# Patient Record
Sex: MALE | Race: WHITE | Employment: FULL TIME | URBAN - METROPOLITAN AREA
[De-identification: names, ages, dates, MRNs, and addresses within clinical notes are randomized per-mention and may not be internally consistent; named-entity substitution may affect disease eponyms.]

---

## 2017-10-06 ENCOUNTER — OFFICE VISIT (OUTPATIENT)
Dept: FAMILY MEDICINE | Facility: CLINIC | Age: 32
End: 2017-10-06
Payer: COMMERCIAL

## 2017-10-06 VITALS
HEART RATE: 68 BPM | SYSTOLIC BLOOD PRESSURE: 113 MMHG | BODY MASS INDEX: 19.7 KG/M2 | TEMPERATURE: 97.6 F | WEIGHT: 133 LBS | OXYGEN SATURATION: 98 % | HEIGHT: 69 IN | DIASTOLIC BLOOD PRESSURE: 74 MMHG

## 2017-10-06 DIAGNOSIS — Z00.00 ROUTINE GENERAL MEDICAL EXAMINATION AT A HEALTH CARE FACILITY: Primary | ICD-10-CM

## 2017-10-06 DIAGNOSIS — Z13.220 SCREENING CHOLESTEROL LEVEL: ICD-10-CM

## 2017-10-06 DIAGNOSIS — Z23 NEED FOR PROPHYLACTIC VACCINATION AND INOCULATION AGAINST INFLUENZA: ICD-10-CM

## 2017-10-06 PROCEDURE — 90688 IIV4 VACCINE SPLT 0.5 ML IM: CPT | Performed by: FAMILY MEDICINE

## 2017-10-06 PROCEDURE — 80061 LIPID PANEL: CPT | Performed by: FAMILY MEDICINE

## 2017-10-06 PROCEDURE — 36415 COLL VENOUS BLD VENIPUNCTURE: CPT | Performed by: FAMILY MEDICINE

## 2017-10-06 PROCEDURE — 99395 PREV VISIT EST AGE 18-39: CPT | Mod: 25 | Performed by: FAMILY MEDICINE

## 2017-10-06 PROCEDURE — 90471 IMMUNIZATION ADMIN: CPT | Performed by: FAMILY MEDICINE

## 2017-10-06 NOTE — NURSING NOTE
"Chief Complaint   Patient presents with     Physical       Initial /74 (BP Location: Left arm, Patient Position: Chair, Cuff Size: Adult Regular)  Pulse 68  Temp 97.6  F (36.4  C) (Tympanic)  Ht 5' 9\" (1.753 m)  Wt 133 lb (60.3 kg)  SpO2 98%  BMI 19.64 kg/m2 Estimated body mass index is 19.64 kg/(m^2) as calculated from the following:    Height as of this encounter: 5' 9\" (1.753 m).    Weight as of this encounter: 133 lb (60.3 kg).  Medication Reconciliation: unable or not appropriate to perform     Melanie Chavez MA      "

## 2017-10-06 NOTE — LETTER
October 9, 2017      Simon Cuellar  [address]  [place] [address]        Dear ,    We are writing to inform you of your test results.    Cholesterol levels at goal. Recheck in about 5 year(s). Call or send us a [employer] message if you have questions.    Resulted Orders   Lipid panel reflex to direct LDL   Result Value Ref Range    Cholesterol 146 <200 mg/dL    Triglycerides 96 <150 mg/dL      Comment:      Non Fasting    HDL Cholesterol 80 >39 mg/dL    LDL Cholesterol Calculated 47 <100 mg/dL      Comment:      Desirable:       <100 mg/dl    Non HDL Cholesterol 66 <130 mg/dL       If you have any questions or concerns, please call the clinic at the number listed above.       Sincerely,        Tutu Garcia MD/nr

## 2017-10-06 NOTE — MR AVS SNAPSHOT
After Visit Summary   10/6/2017    Simon Cuellar    MRN: [id number]           Patient Information     Date Of Birth          1985        Visit Information        Provider Department      10/6/2017 3:00 PM Tutu Garcia MD [facility]        Today's Diagnoses     Routine general medical examination at a health care facility    -  1    Screening cholesterol level          Care Instructions      Preventive Health Recommendations  Male Ages 26 - 39    Yearly exam:             See your health care provider every year in order to  o   Review health changes.   o   Discuss preventive care.    o   Review your medicines if your doctor has prescribed any.    You should be tested each year for STDs (sexually transmitted diseases), if you re at risk.     After age 35, talk to your provider about cholesterol testing. If you are at risk for heart disease, have your cholesterol tested at least every 5 years.     If you are at risk for diabetes, you should have a diabetes test (fasting glucose).  Shots: Get a flu shot each year. Get a tetanus shot every 10 years.     Nutrition:    Eat at least 5 servings of fruits and vegetables daily.     Eat whole-grain bread, whole-wheat pasta and brown rice instead of white grains and rice.     Talk to your provider about Calcium and Vitamin D.     Lifestyle    Exercise for at least 150 minutes a week (30 minutes a day, 5 days a week). This will help you control your weight and prevent disease.     Limit alcohol to one drink per day.     No smoking.     Wear sunscreen to prevent skin cancer.     See your dentist every six months for an exam and cleaning.             Follow-ups after your visit        Additional Services     UROLOGY ADULT REFERRAL       Your provider has referred you to: [facility]: [facility] [phone]   [email or web address]  [facility]: [facility] [phone]  "[email or web address]  [place]: [facility]. [facility] [place] [phone]   [email or web address]  [place] [phone]   [email or web address]  [facility]: [facility] [place] [phone]   [email or web address]    Please be aware that coverage of these services is subject to the terms and limitations of your health insurance plan.  Call member services at your health plan with any benefit or coverage questions.      Please bring the following with you to your appointment:    (1) Any X-Rays, CTs or MRIs which have been performed.  Contact the facility where they were done to arrange for  prior to your scheduled appointment.    (2) List of current medications  (3) This referral request   (4) Any documents/labs given to you for this referral                  Who to contact     If you have questions or need follow up information about today's clinic visit or your schedule please contact [facility] directly at [phone].  Normal or non-critical lab and imaging results will be communicated to you by MyChart, letter or phone within 4 business days after the clinic has received the results. If you do not hear from us within 7 days, please contact the clinic through [employer] or phone. If you have a critical or abnormal lab result, we will notify you by phone as soon as possible.  Submit refill requests through [employer] or call your pharmacy and they will forward the refill request to us. Please allow 3 business days for your refill to be completed.          Additional Information About Your Visit        [employer] Information     [employer] lets you send messages to your doctor, view your test results, renew your prescriptions, schedule appointments and more. To sign up, go to [email or web address] . Click on \"Log in\" on the left side of the " "screen, which will take you to the Welcome page. Then click on \"Sign up Now\" on the right side of the page.     You will be asked to enter the access code listed below, as well as some personal information. Please follow the directions to create your username and password.     Your access code is: [email or web address]  Expires: 2018  3:37 PM     Your access code will  in 90 days. If you need help or a new code, please call your [place] clinic or [phone].        Care EveryWhere ID     This is your Care EveryWhere ID. This could be used by other organizations to access your [place] medical records  [id number]        Your Vitals Were     Pulse Temperature Height Pulse Oximetry BMI (Body Mass Index)       68 97.6  F (36.4  C) (Tympanic) 5' 9\" (1.753 m) 98% 19.64 kg/m2        Blood Pressure from Last 3 Encounters:   10/06/17 113/74   11/12/15 104/58    Weight from Last 3 Encounters:   10/06/17 133 lb (60.3 kg)   11/12/15 134 lb 12.8 oz (61.1 kg)              We Performed the Following     Lipid panel reflex to direct LDL     UROLOGY ADULT REFERRAL        Primary Care Provider    None Specified       No primary provider on file.        Equal Access to Services     FERDINAND DOWNING : Hadii antonette ku hadasho Soraeannali, waaxda luqadaha, qaybta kaalmada adeegyada, linda angela . So [facility] [phone].    ATENCIÓN: Si habla español, tiene a lopez disposición servicios gratuitos de asistencia lingüística. Llame al [phone].    We comply with applicable federal civil rights laws and [place] laws. We do not discriminate on the basis of race, color, national origin, age, disability, sex, sexual orientation, or gender identity.            Thank you!     Thank you for choosing [facility]  for your care. Our goal is always to provide you with excellent care. Hearing back from our patients is one way we can continue to improve our services. Please take a few minutes to complete the " written survey that you may receive in the mail after your visit with us. Thank you!             Your Updated Medication List - Protect others around you: Learn how to safely use, store and throw away your medicines at www.disposemymeds.org.      Notice  As of 10/6/2017  3:37 PM    You have not been prescribed any medications.

## 2017-10-06 NOTE — PROGRESS NOTES
SUBJECTIVE:   CC: Simon Cuellar is an 32 year old male who presents for preventative health visit.     Physical   Annual:     Getting at least 3 servings of Calcium per day::  Yes    Bi-annual eye exam::  Yes    Dental care twice a year::  Yes    Sleep apnea or symptoms of sleep apnea::  None    Diet::  Regular (no restrictions)    Frequency of exercise::  None    Taking medications regularly::  Yes    Medication side effects::  Not applicable    Additional concerns today::  YES      -------------------------------------    Today's PHQ-2 Score:   PHQ-2 ( 1999 Pfizer) 10/6/2017   Q1: Little interest or pleasure in doing things 0   Q2: Feeling down, depressed or hopeless 0   PHQ-2 Score 0   Q1: Little interest or pleasure in doing things Not at all   Q2: Feeling down, depressed or hopeless Not at all   PHQ-2 Score 0       Abuse: Current or Past(Physical, Sexual or Emotional)- No  Do you feel safe in your environment - Yes    Social History   Substance Use Topics     Smoking status: Former Smoker     Packs/day: 0.25     Years: 9.00     Types: Cigarettes     Quit date: 11/12/2012     Smokeless tobacco: Never Used     Alcohol use 0.0 oz/week     0 Standard drinks or equivalent per week      Comment: 2-3 drinks per week     The patient does not drink >3 drinks per day nor >7 drinks per week.    Last PSA: No results found for: PSA    Reviewed orders with patient. Reviewed health maintenance and updated orders accordingly - Yes  Labs reviewed in EPIC    Reviewed and updated as needed this visit by clinical staff  Tobacco  Allergies  Meds  Med Hx  Surg Hx  Fam Hx  Soc Hx        Reviewed and updated as needed this visit by Provider        ROS:  C: NEGATIVE for fever, chills, change in weight  I: NEGATIVE for worrisome rashes, moles or lesions  E: NEGATIVE for vision changes or irritation  ENT: NEGATIVE for ear, mouth and throat problems  R: NEGATIVE for significant cough or SOB  CV: NEGATIVE for chest pain,  "palpitations or peripheral edema  GI: NEGATIVE for nausea, abdominal pain, heartburn, or change in bowel habits   male: negative for dysuria, hematuria, decreased urinary stream, erectile dysfunction, urethral discharge  M: NEGATIVE for significant arthralgias or myalgia  N: NEGATIVE for weakness, dizziness or paresthesias  P: NEGATIVE for changes in mood or affect    OBJECTIVE:   /74 (BP Location: Left arm, Patient Position: Chair, Cuff Size: Adult Regular)  Pulse 68  Temp 97.6  F (36.4  C) (Tympanic)  Ht 5' 9\" (1.753 m)  Wt 133 lb (60.3 kg)  SpO2 98%  BMI 19.64 kg/m2    EXAM:  GENERAL: healthy, alert and no distress  EYES: Eyes grossly normal to inspection, PERRL and conjunctivae and sclerae normal  HENT: ear canals and TM's normal, nose and mouth without ulcers or lesions  NECK: no adenopathy, no asymmetry, masses, or scars and thyroid normal to palpation  RESP: lungs clear to auscultation - no rales, rhonchi or wheezes  CV: regular rate and rhythm, normal S1 S2, no S3 or S4, no murmur, click or rub, no peripheral edema and peripheral pulses strong  ABDOMEN: soft, nontender, no hepatosplenomegaly, no masses and bowel sounds normal  MS: no gross musculoskeletal defects noted, no edema  SKIN: no suspicious lesions or rashes  NEURO: Normal strength and tone, mentation intact and speech normal  PSYCH: mentation appears normal, affect normal/bright    ASSESSMENT/PLAN:       ICD-10-CM    1. Routine general medical examination at a health care facility Z00.00 UROLOGY ADULT REFERRAL   2. Screening cholesterol level Z13.220 Lipid panel reflex to direct LDL   3. Need for prophylactic vaccination and inoculation against influenza Z23 FLU VACCINE, 3 YRS +, IM (QUADRIVALENT W/PRESERVATIVES/MULTI-DOSE) [70929]     Vaccine Administration, Initial [65772]       COUNSELING:   Reviewed preventive health counseling, as reflected in patient instructions       Regular exercise       Healthy diet/nutrition       " "Immunizations    Vaccinated for: Influenza           reports that he quit smoking about 4 years ago. His smoking use included Cigarettes. He has a 2.25 pack-year smoking history. He has never used smokeless tobacco.      Estimated body mass index is 19.64 kg/(m^2) as calculated from the following:    Height as of this encounter: 5' 9\" (1.753 m).    Weight as of this encounter: 133 lb (60.3 kg).         Counseling Resources:  ATP IV Guidelines  Pooled Cohorts Equation Calculator  FRAX Risk Assessment  ICSI Preventive Guidelines  Dietary Guidelines for Americans, 2010  [employer]'s MyPlate  ASA Prophylaxis  Lung CA Screening    Tutu Garcia MD  [facility] for HPI/ROS submitted by the patient on 10/6/2017   PHQ-2 Score: 0    Injectable Influenza Immunization Documentation    1.  Is the person to be vaccinated sick today?   No    2. Does the person to be vaccinated have an allergy to a component   of the vaccine?   No    3. Has the person to be vaccinated ever had a serious reaction   to influenza vaccine in the past?   No    4. Has the person to be vaccinated ever had Guillain-Barré syndrome?   No    Form completed by Melanie Chavez MA           "

## 2017-10-07 LAB
CHOLEST SERPL-MCNC: 146 MG/DL
HDLC SERPL-MCNC: 80 MG/DL
LDLC SERPL CALC-MCNC: 47 MG/DL
NONHDLC SERPL-MCNC: 66 MG/DL
TRIGL SERPL-MCNC: 96 MG/DL

## 2018-11-23 ENCOUNTER — OFFICE VISIT (OUTPATIENT)
Dept: FAMILY MEDICINE | Facility: CLINIC | Age: 33
End: 2018-11-23
Payer: COMMERCIAL

## 2018-11-23 VITALS
WEIGHT: 141.13 LBS | BODY MASS INDEX: 20.84 KG/M2 | HEART RATE: 68 BPM | TEMPERATURE: 98.3 F | DIASTOLIC BLOOD PRESSURE: 63 MMHG | SYSTOLIC BLOOD PRESSURE: 102 MMHG

## 2018-11-23 DIAGNOSIS — B02.9 HERPES ZOSTER WITHOUT COMPLICATION: Primary | ICD-10-CM

## 2018-11-23 PROCEDURE — 99213 OFFICE O/P EST LOW 20 MIN: CPT | Performed by: FAMILY MEDICINE

## 2018-11-23 RX ORDER — ACYCLOVIR 800 MG/1
800 TABLET ORAL
Qty: 35 TABLET | Refills: 0 | Status: SHIPPED | OUTPATIENT
Start: 2018-11-23 | End: 2023-03-15

## 2018-11-23 ASSESSMENT — PAIN SCALES - GENERAL: PAINLEVEL: NO PAIN (0)

## 2018-11-23 NOTE — PROGRESS NOTES
SUBJECTIVE:   Simon Cuellar is a 33 year old male who presents to clinic today for the following health issues:       Rash on left side for the past 1.5 weeks    none    Problem list and histories reviewed & adjusted, as indicated.  Additional history: as documented         Reviewed and updated as needed this visit by clinical staff  Tobacco  Allergies  Meds  Med Hx  Surg Hx  Fam Hx  Soc Hx      Reviewed and updated as needed this visit by Provider          this itches a lot    Some pain    A rash back in college was a little different    Otherwise feels fine      On exam single patch of  Skin involvement about golf ball size diameter    Not real tender on palpation but sensation a bit different per patient compared to corresponding area on right     Very small red papular areas present in affected areas, no cellulitis.  Looks suspicious for shingles though does not extend around flank    ASSESSMENT / PLAN:  (B02.9) Herpes zoster without complication  (primary encounter diagnosis)  Comment: prudent to cover for possible shingles.  Even though has been 1 1/2 weeks since the rash started, very little if any downside to starting the acyclovir and may help rash and possibly prevent/ reduce neuralgia.  Discussed in detail.   Plan: acyclovir (ZOVIRAX) 800 MG tablet        Follow up prn symptoms       I reviewed the patient's medications, allergies, medical history, family history, and social history.    Myke Choudhury MD

## 2018-11-23 NOTE — MR AVS SNAPSHOT
After Visit Summary   11/23/2018    Simon Cuellar    MRN: [id number]           Patient Information     Date Of Birth          1985        Visit Information        Provider Department      11/23/2018 7:40 AM Myke Choudhury MD [facility]        Today's Diagnoses     Herpes zoster without complication    -  1      Care Instructions    Start acyclovir    Could use over the counter hydrocortisone cream    Try not to scratch    Lotion okay    Call if symptoms not resolving          Follow-ups after your visit        Who to contact     If you have questions or need follow up information about today's clinic visit or your schedule please contact [facility] directly at [phone].  Normal or non-critical lab and imaging results will be communicated to you by MyChart, letter or phone within 4 business days after the clinic has received the results. If you do not hear from us within 7 days, please contact the clinic through MyChart or phone. If you have a critical or abnormal lab result, we will notify you by phone as soon as possible.  Submit refill requests through [employer] or call your pharmacy and they will forward the refill request to us. Please allow 3 business days for your refill to be completed.          Additional Information About Your Visit        Care EveryWhere ID     This is your Care EveryWhere ID. This could be used by other organizations to access your [facility] medical records  [id number]        Your Vitals Were     Pulse Temperature BMI (Body Mass Index)             68 98.3  F (36.8  C) (Oral) 20.84 kg/m2          Blood Pressure from Last 3 Encounters:   11/23/18 102/63   10/06/17 113/74   11/12/15 104/58    Weight from Last 3 Encounters:   11/23/18 141 lb 2 oz (64 kg)   10/06/17 133 lb (60.3 kg)   11/12/15 134 lb 12.8 oz (61.1 kg)              Today, you had the following     No orders found for display         Today's  Medication Changes          These changes are accurate as of 11/23/18  8:18 AM.  If you have any questions, ask your nurse or doctor.               Start taking these medicines.        Dose/Directions    acyclovir 800 MG tablet   Commonly known as:  ZOVIRAX   Used for:  Herpes zoster without complication   Started by:  Myke Choudhury MD        Dose:  800 mg   Take 1 tablet (800 mg) by mouth 5 times daily   Quantity:  35 tablet   Refills:  0            Where to get your medicines      These medications were sent to [facility] - [place], [place] - [address]  [address], [place] [address]     Phone:  [phone]     acyclovir 800 MG tablet                Primary Care Provider Office Phone # Fax #    [facility] [phone]       [address] [place] [address]        Equal Access to Services     FERDINAND DOWNING : Hadii aad ku hadasho Sochelsy, waaxda luqadaha, qaybta kaalmada aderyleeyajc, linda holguin. So [facility] [phone].    ATENCIÓN: Si habla español, tiene a lopez disposición servicios gratuitos de asistencia lingüística. Herminio al [phone].    We comply with applicable federal civil rights laws and [place] laws. We do not discriminate on the basis of race, color, national origin, age, disability, sex, sexual orientation, or gender identity.            Thank you!     Thank you for choosing [facility]  for your care. Our goal is always to provide you with excellent care. Hearing back from our patients is one way we can continue to improve our services. Please take a few minutes to complete the written survey that you may receive in the mail after your visit with us. Thank you!             Your Updated Medication List - Protect others around you: Learn how to safely use, store and throw away your medicines at www.disposemymeds.org.          This list is accurate as of 11/23/18  8:18  AM.  Always use your most recent med list.                   Brand Name Dispense Instructions for use Diagnosis    acyclovir 800 MG tablet    ZOVIRAX    35 tablet    Take 1 tablet (800 mg) by mouth 5 times daily    Herpes zoster without complication

## 2018-11-23 NOTE — PATIENT INSTRUCTIONS
Start acyclovir    Could use over the counter hydrocortisone cream    Try not to scratch    Lotion okay    Call if symptoms not resolving

## 2021-03-28 ENCOUNTER — HEALTH MAINTENANCE LETTER (OUTPATIENT)
Age: 36
End: 2021-03-28

## 2021-09-11 ENCOUNTER — HEALTH MAINTENANCE LETTER (OUTPATIENT)
Age: 36
End: 2021-09-11

## 2021-12-13 ENCOUNTER — IMMUNIZATION (OUTPATIENT)
Dept: NURSING | Facility: CLINIC | Age: 36
End: 2021-12-13
Payer: COMMERCIAL

## 2021-12-13 PROCEDURE — 0004A PR COVID VAC PFIZER DIL RECON 30 MCG/0.3 ML IM: CPT

## 2021-12-13 PROCEDURE — 91300 PR COVID VAC PFIZER DIL RECON 30 MCG/0.3 ML IM: CPT

## 2022-04-23 ENCOUNTER — HEALTH MAINTENANCE LETTER (OUTPATIENT)
Age: 37
End: 2022-04-23

## 2022-10-30 ENCOUNTER — HEALTH MAINTENANCE LETTER (OUTPATIENT)
Age: 37
End: 2022-10-30

## 2023-03-15 ENCOUNTER — OFFICE VISIT (OUTPATIENT)
Dept: FAMILY MEDICINE | Facility: CLINIC | Age: 38
End: 2023-03-15
Payer: COMMERCIAL

## 2023-03-15 VITALS
BODY MASS INDEX: 21.33 KG/M2 | SYSTOLIC BLOOD PRESSURE: 123 MMHG | OXYGEN SATURATION: 79 % | WEIGHT: 149 LBS | RESPIRATION RATE: 16 BRPM | HEART RATE: 79 BPM | TEMPERATURE: 98 F | DIASTOLIC BLOOD PRESSURE: 82 MMHG | HEIGHT: 70 IN

## 2023-03-15 DIAGNOSIS — L72.3 INFLAMED EPIDERMOID CYST OF SKIN: ICD-10-CM

## 2023-03-15 DIAGNOSIS — Z13.220 SCREENING FOR HYPERLIPIDEMIA: ICD-10-CM

## 2023-03-15 DIAGNOSIS — Z11.4 SCREENING FOR HIV (HUMAN IMMUNODEFICIENCY VIRUS): ICD-10-CM

## 2023-03-15 DIAGNOSIS — Z30.09 FAMILY PLANNING: ICD-10-CM

## 2023-03-15 DIAGNOSIS — Z76.89 ENCOUNTER TO ESTABLISH CARE WITH NEW DOCTOR: ICD-10-CM

## 2023-03-15 DIAGNOSIS — Z00.00 ROUTINE GENERAL MEDICAL EXAMINATION AT A HEALTH CARE FACILITY: Primary | ICD-10-CM

## 2023-03-15 DIAGNOSIS — Z13.1 ENCOUNTER FOR SCREENING FOR DIABETES MELLITUS: ICD-10-CM

## 2023-03-15 DIAGNOSIS — Z11.59 NEED FOR HEPATITIS C SCREENING TEST: ICD-10-CM

## 2023-03-15 LAB
ALBUMIN SERPL BCG-MCNC: 4.8 G/DL (ref 3.5–5.2)
ALP SERPL-CCNC: 54 U/L (ref 40–129)
ALT SERPL W P-5'-P-CCNC: 16 U/L (ref 10–50)
ANION GAP SERPL CALCULATED.3IONS-SCNC: 15 MMOL/L (ref 7–15)
AST SERPL W P-5'-P-CCNC: 24 U/L (ref 10–50)
BILIRUB SERPL-MCNC: 0.7 MG/DL
BUN SERPL-MCNC: 12.7 MG/DL (ref 6–20)
CALCIUM SERPL-MCNC: 9.6 MG/DL (ref 8.6–10)
CHLORIDE SERPL-SCNC: 102 MMOL/L (ref 98–107)
CHOLEST SERPL-MCNC: 163 MG/DL
CREAT SERPL-MCNC: 1.19 MG/DL (ref 0.67–1.17)
DEPRECATED HCO3 PLAS-SCNC: 25 MMOL/L (ref 22–29)
GFR SERPL CREATININE-BSD FRML MDRD: 81 ML/MIN/1.73M2
GLUCOSE SERPL-MCNC: 84 MG/DL (ref 70–99)
HBA1C MFR BLD: 5.2 % (ref 0–5.6)
HCV AB SERPL QL IA: NONREACTIVE
HDLC SERPL-MCNC: 76 MG/DL
HIV 1+2 AB+HIV1 P24 AG SERPL QL IA: NONREACTIVE
LDLC SERPL CALC-MCNC: 72 MG/DL
NONHDLC SERPL-MCNC: 87 MG/DL
POTASSIUM SERPL-SCNC: 4 MMOL/L (ref 3.4–5.3)
PROT SERPL-MCNC: 7.7 G/DL (ref 6.4–8.3)
SODIUM SERPL-SCNC: 142 MMOL/L (ref 136–145)
TRIGL SERPL-MCNC: 76 MG/DL

## 2023-03-15 PROCEDURE — 87389 HIV-1 AG W/HIV-1&-2 AB AG IA: CPT | Performed by: NURSE PRACTITIONER

## 2023-03-15 PROCEDURE — 99213 OFFICE O/P EST LOW 20 MIN: CPT | Mod: 25 | Performed by: NURSE PRACTITIONER

## 2023-03-15 PROCEDURE — 83036 HEMOGLOBIN GLYCOSYLATED A1C: CPT | Performed by: NURSE PRACTITIONER

## 2023-03-15 PROCEDURE — 36415 COLL VENOUS BLD VENIPUNCTURE: CPT | Performed by: NURSE PRACTITIONER

## 2023-03-15 PROCEDURE — 80061 LIPID PANEL: CPT | Performed by: NURSE PRACTITIONER

## 2023-03-15 PROCEDURE — 86803 HEPATITIS C AB TEST: CPT | Performed by: NURSE PRACTITIONER

## 2023-03-15 PROCEDURE — 99385 PREV VISIT NEW AGE 18-39: CPT | Mod: 25 | Performed by: NURSE PRACTITIONER

## 2023-03-15 PROCEDURE — 80053 COMPREHEN METABOLIC PANEL: CPT | Performed by: NURSE PRACTITIONER

## 2023-03-15 PROCEDURE — 90636 HEP A/HEP B VACC ADULT IM: CPT | Performed by: NURSE PRACTITIONER

## 2023-03-15 PROCEDURE — 90471 IMMUNIZATION ADMIN: CPT | Performed by: NURSE PRACTITIONER

## 2023-03-15 ASSESSMENT — ENCOUNTER SYMPTOMS
NAUSEA: 0
HEMATOCHEZIA: 0
HEARTBURN: 0
HEADACHES: 1
CONSTIPATION: 0
PALPITATIONS: 0
JOINT SWELLING: 0
HEMATURIA: 0
NERVOUS/ANXIOUS: 0
FREQUENCY: 0
ARTHRALGIAS: 0
MYALGIAS: 0
SHORTNESS OF BREATH: 0
DIZZINESS: 0
WEAKNESS: 0
FEVER: 0
ABDOMINAL PAIN: 0
CHILLS: 0
DIARRHEA: 0
EYE PAIN: 0
SORE THROAT: 0
PARESTHESIAS: 0
COUGH: 1
DYSURIA: 0

## 2023-03-15 ASSESSMENT — PAIN SCALES - GENERAL: PAINLEVEL: NO PAIN (0)

## 2023-03-15 NOTE — NURSING NOTE
Prior to immunization administration, verified patients identity using patient s name and date of birth. Please see Immunization Activity for additional information.     Screening Questionnaire for Adult Immunization    Are you sick today?   No   Do you have allergies to medications, food, a vaccine component or latex?   No   Have you ever had a serious reaction after receiving a vaccination?   No   Do you have a long-term health problem with heart, lung, kidney, or metabolic disease (e.g., diabetes), asthma, a blood disorder, no spleen, complement component deficiency, a cochlear implant, or a spinal fluid leak?  Are you on long-term aspirin therapy?   No   Do you have cancer, leukemia, HIV/AIDS, or any other immune system problem?   Yes   Do you have a parent, brother, or sister with an immune system problem?   No   In the past 3 months, have you taken medications that affect  your immune system, such as prednisone, other steroids, or anticancer drugs; drugs for the treatment of rheumatoid arthritis, Crohn s disease, or psoriasis; or have you had radiation treatments?   No   Have you had a seizure, or a brain or other nervous system problem?   No   During the past year, have you received a transfusion of blood or blood    products, or been given immune (gamma) globulin or antiviral drug?   No   For women: Are you pregnant or is there a chance you could become       pregnant during the next month?   No   Have you received any vaccinations in the past 4 weeks?   No     Immunization questionnaire was positive for at least one answer.  Notified Kd.        Per orders of Dr. Belcher, injection of Twinrx given by Anurag Steiner CMA. Patient instructed to remain in clinic for 15 minutes afterwards, and to report any adverse reaction to me immediately.       Screening performed by Anurag Steiner CMA on 3/15/2023 at 2:34 PM.

## 2023-03-15 NOTE — PROGRESS NOTES
SUBJECTIVE:   CC: CHRISTY is an 37 year old who presents for preventative health visit.   Patient has been advised of split billing requirements and indicates understanding: Yes     37 year old year old male  with PMH There is no problem list on file for this patient.   in clinic for preventive health care exam.     In addition to the preventive visit, 25  minutes of the appointment were spent evaluating and developing a treatment plan for his additional concern(s).      - establish care     Healthy Habits:     Getting at least 3 servings of Calcium per day:  Yes    Bi-annual eye exam:  Yes    Dental care twice a year:  NO    Sleep apnea or symptoms of sleep apnea:  Daytime drowsiness    Diet:  Regular (no restrictions)    Frequency of exercise:  4-5 days/week    Duration of exercise:  15-30 minutes    Taking medications regularly:  Yes    Medication side effects:  None    PHQ-2 Total Score: 2    Additional concerns today:  No    Today's PHQ-2 Score:       3/15/2023     1:55 PM   PHQ-2 ( 1999 [employer])   Q1: Little interest or pleasure in doing things 0   Q2: Feeling down, depressed or hopeless 2   PHQ-2 Score 2   Q1: Little interest or pleasure in doing things Not at all   Q2: Feeling down, depressed or hopeless More than half the days   PHQ-2 Score 2       Have you ever done Advance Care Planning? (For example, a Health Directive, POLST, or a discussion with a medical provider or your loved ones about your wishes): No, advance care planning information given to patient to review.  Patient declined advance care planning discussion at this time.    Social History     Tobacco Use     Smoking status: Some Days     Packs/day: 0.25     Years: 9.00     Pack years: 2.25     Types: Cigarettes     Last attempt to quit: 11/12/2012     Years since quitting: 10.4     Smokeless tobacco: Never   Vaping Use     Vaping status: Never Used   Substance Use Topics     Alcohol use: Yes     Alcohol/week: 0.0 standard drinks of alcohol      "Comment: 2-3 drinks per week             3/15/2023     2:03 PM   Alcohol Use   Prescreen: >3 drinks/day or >7 drinks/week? No          View : No data to display.                Last PSA: No results found for: PSA    Reviewed orders with patient. Reviewed health maintenance and updated orders accordingly - Yes  Lab work is in process  Labs reviewed in EPIC  BP Readings from Last 3 Encounters:   03/15/23 123/82   11/23/18 102/63   10/06/17 113/74    Wt Readings from Last 3 Encounters:   03/15/23 67.6 kg (149 lb)   11/23/18 64 kg (141 lb 2 oz)   10/06/17 60.3 kg (133 lb)         Reviewed and updated as needed this visit by clinical staff   Tobacco  Allergies  Meds  Problems  Med Hx  Surg Hx  Fam Hx          Reviewed and updated as needed this visit by Provider   Tobacco  Allergies  Meds  Problems  Med Hx  Surg Hx  Fam Hx             Review of Systems   Constitutional: Negative for chills and fever.   HENT: Negative for congestion, ear pain, hearing loss and sore throat.    Eyes: Negative for pain and visual disturbance.   Respiratory: Positive for cough. Negative for shortness of breath.    Cardiovascular: Negative for chest pain, palpitations and peripheral edema.   Gastrointestinal: Negative for abdominal pain, constipation, diarrhea, heartburn, hematochezia and nausea.   Genitourinary: Negative for dysuria, frequency, genital sores, hematuria, impotence, penile discharge and urgency.   Musculoskeletal: Negative for arthralgias, joint swelling and myalgias.   Skin: Negative for rash.   Neurological: Positive for headaches. Negative for dizziness, weakness and paresthesias.   Psychiatric/Behavioral: Negative for mood changes. The patient is not nervous/anxious.          OBJECTIVE:   /82 (BP Location: Right arm, Patient Position: Sitting, Cuff Size: Adult Regular)   Pulse 79   Temp 98  F (36.7  C) (Temporal)   Resp 16   Ht 1.778 m (5' 10\")   Wt 67.6 kg (149 lb)   SpO2 (!) 79%   BMI 21.38 " kg/m      Physical Exam  GENERAL: healthy, alert and no distress  EYES: Eyes grossly normal to inspection, PERRL and conjunctivae and sclerae normal  HENT: ear canals and TM's normal, nose and mouth without ulcers or lesions  NECK: no adenopathy, no asymmetry, masses, or scars and thyroid normal to palpation  RESP: lungs clear to auscultation - no rales, rhonchi or wheezes  CV: regular rate and rhythm, normal S1 S2, no S3 or S4, no murmur, click or rub, no peripheral edema and peripheral pulses strong  ABDOMEN: soft, nontender, no hepatosplenomegaly, no masses and bowel sounds normal  MS: no gross musculoskeletal defects noted, no edema  SKIN: no suspicious lesions or rashes  NEURO: Normal strength and tone, mentation intact and speech normal  PSYCH: mentation appears normal, affect normal/bright    Diagnostic Test Results:  Labs reviewed in Epic    ASSESSMENT/PLAN:   Simon was seen today for physical.    Diagnoses and all orders for this visit:    Routine general medical examination at a health care facility  Preventative exam w/no abnormalities and/or concerns listed in diagnoses; discussed health maintenance screenings including prostate, breast, cervical and colorectal ca screenings related to gender;  reviewed and reconciled medication, medical history and patient related health concerns  Plan: obtain metabolic labs    -     REVIEW OF HEALTH MAINTENANCE PROTOCOL ORDERS  -     Lipid Profile; Future  -     Hemoglobin A1c; Future  -     Comprehensive metabolic panel; Future  -     HEPA/HEPB VACCINE ADULT IM  -     Cancel: Lipid Profile  -     Hemoglobin A1c  -     Comprehensive metabolic panel    Screening for HIV (human immunodeficiency virus)  -     HIV Antigen Antibody Combo; Future  -     HIV Antigen Antibody Combo    Need for hepatitis C screening test  -     Hepatitis C Screen Reflex to HCV RNA Quant and Genotype; Future  -     Hepatitis C Screen Reflex to HCV RNA Quant and Genotype    Screening for  hyperlipidemia  Assess ASCVD risk for statin therapy to reduce CAD/CVA ristk  Plan: fasting/non-fasting test  -     Lipid panel reflex to direct LDL Fasting; Future  -     Lipid panel reflex to direct LDL Non-fasting; Future  -     Lipid Profile; Future  -     Lipid panel reflex to direct LDL Non-fasting  -     Cancel: Lipid Profile  -     Comprehensive metabolic panel    Encounter for screening for diabetes mellitus  -     Hemoglobin A1c; Future  -     Comprehensive metabolic panel; Future  -     Hemoglobin A1c    Encounter to establish care with new doctor  Reviewed chronic health conditions; medications, labs and pertinent health concerns today    Inflamed epidermoid cyst of skin  -     Adult Dermatology Referral; Future    Family planning  -     Adult Urology  Referral; Future        Patient has been advised of split billing requirements and indicates understanding: Yes      COUNSELING:   Reviewed preventive health counseling, as reflected in patient instructions        He reports that he has been smoking cigarettes. He has a 2.25 pack-year smoking history. He has never used smokeless tobacco.            DARIUS Summers CNP  [facility]

## 2023-03-20 ENCOUNTER — ALLIED HEALTH/NURSE VISIT (OUTPATIENT)
Dept: FAMILY MEDICINE | Facility: CLINIC | Age: 38
End: 2023-03-20
Payer: COMMERCIAL

## 2023-03-20 VITALS — OXYGEN SATURATION: 97 %

## 2023-03-20 DIAGNOSIS — R68.89 ABNORMAL VITAL SIGNS: Primary | ICD-10-CM

## 2023-03-20 PROCEDURE — 99207 PR NO CHARGE NURSE ONLY: CPT

## 2023-03-20 NOTE — PROGRESS NOTES
See 3/15/23 myChart encounter.     Rechecked SpO2 as was documented as 79% from 3/15/23 office visit.    This was very likely an error but wanted to be sure.     O2 saturation today is 97% on room air.     The numbers must have gotten switched when MA was entering them in as 79.     Updated O2 in chart.       Florencia Raines, JODYN RN  [facility]

## 2023-03-30 NOTE — TELEPHONE ENCOUNTER
MEDICAL RECORDS REQUEST   [facility]  Urology Clinic  [address] [place] [address]  PHONE: [phone]  Fax: [phone]        FUTURE VISIT INFORMATION                                                   Simon Pineda Alisa, : 1985 scheduled for future visit at [facility]    APPOINTMENT INFORMATION:    Date: 2023    Provider:  Ez Michele MD    Reason for Visit/Diagnosis: VASECTOMY CONSULT    REFERRAL INFORMATION:    Referring provider:  Rosa Maria Yi APRN CNP in [facility]      RECORDS REQUESTED FOR VISIT                                                     NOTES  STATUS/DETAILS   OFFICE NOTE from referring provider  yes, 03/15/2023 -- Rosa Maria Yi APRN CNP in [facility]   MEDICATION LIST  no   LABS     URINALYSIS (UA)  no     PRE-VISIT CHECKLIST      Joint diagnostic appointment coordinated correctly          (ensure right order & amount of time) Yes   RECORD COLLECTION COMPLETE Yes

## 2023-04-05 NOTE — RESULT ENCOUNTER NOTE
Roberto Carlos HARRISON,    Your recent results are normal. Any results slightly above or below the normal range have been evaluated as clinically stable.     Let me know if you have any questions or concerns.    Sincerely,  DARIUS Summers CNP

## 2023-05-31 ENCOUNTER — PRE VISIT (OUTPATIENT)
Dept: UROLOGY | Facility: CLINIC | Age: 38
End: 2023-05-31
Payer: COMMERCIAL

## 2023-05-31 NOTE — CONFIDENTIAL NOTE
Reason for visit: vasectomy consult    Records/imaging/labs/orders: in epic    At Rooming: jana Lara  05/31/23  2:39 PM

## 2023-06-09 ENCOUNTER — PRE VISIT (OUTPATIENT)
Dept: UROLOGY | Facility: CLINIC | Age: 38
End: 2023-06-09

## 2023-06-09 ENCOUNTER — VIRTUAL VISIT (OUTPATIENT)
Dept: UROLOGY | Facility: CLINIC | Age: 38
End: 2023-06-09
Attending: NURSE PRACTITIONER
Payer: COMMERCIAL

## 2023-06-09 DIAGNOSIS — Z30.09 FAMILY PLANNING: ICD-10-CM

## 2023-06-09 PROCEDURE — 99202 OFFICE O/P NEW SF 15 MIN: CPT | Mod: VID | Performed by: UROLOGY

## 2023-06-09 NOTE — LETTER
6/9/2023       RE: Simon Cuellar  [address]  [place] [address]     Dear Colleague,    Thank you for referring your patient, Simon Cuellar, to the [facility] [place] [facility]. Please see a copy of my visit note below.    CHRISTY is a 37 year old who is being evaluated via a billable video visit.      How would you like to obtain your AVS? MyChart  If the video visit is dropped, the invitation should be resent by: Text to cell phone: [phone]  Will anyone else be joining your video visit? No        Video-Visit Details    Type of service:  Video Visit       Originating Location (pt. Location): Home    Distant Location (provider location):  On-site  Platform used for Video Visit: Bhupendra     CC: Desires sterilization, consult for vasectomy from Dr. Rosa Maria Yi     HPI: Simon Cuellar is a 37 year old male with 2 children, and he is intersted in getting a vasectomy for sterilization.      No history of bleeding problems in him or family.  History of right torsion age 14. Status-post pexy.    PMH:   Past Medical History:   Diagnosis Date    Childhood asthma     NO ACTIVE PROBLEMS      Past Surgical History:   Procedure Laterality Date    testicular surgery      torsion   he thinks they maybe just fixed the right side.    FAMILY HX:   Family History   Problem Relation Age of Onset    Colon Cancer Paternal Grandmother     Cancer Paternal Grandfather     Diabetes Maternal Grandfather     Lung Cancer Paternal Aunt       No history of coagulopathies, no  malignancies.     ALLERGIES:    No Known Allergies    MEDS:   No prescription medications at this time.    SOCIAL HISTORY:  Social History     Tobacco Use    Smoking status: Some Days     Packs/day: 0.25     Years: 9.00     Pack years: 2.25     Types: Cigarettes     Last attempt to quit: 11/12/2012     Years since quitting: 10.5    Smokeless tobacco: Never    Vaping Use    Vaping status: Never Used   Substance Use Topics    Alcohol use: Yes     Alcohol/week: 0.0 standard drinks of alcohol     Comment: 2-3 drinks per week    Drug use: No        GENERAL PHYSICAL EXAM:   Exam  General- Alert, oriented, nad.  Pleasant and conversant.  Eyes- anicteric, EOMI.  Resps- normal, non-labored.  No cough  Abdomen-  nondistended.   exam- deferred.   Neurological - no tremors  Skin - no discoloration/ lesions noted  Psychiatric - no anxiety, alert & oriented.       The rest of a comprehensive physical examination is deferred due to video visit restrictions.       I discussed with him at length the risks and benefits of the procedure. He understands that this is a sterilization procedure, and not reversible contraception. He understands that reversals, while possible, are not guaranteed to work and fairly complex. I discussed with him the option of sperm cryopreservation.     I stressed that he continues to be fertile in the post-operative period, and that he should continue using other contraceptive methods, such as a condom, until he obtains a semen analysis and we review the results to confirm success of the procedure and infertility. I also stressed to him that recanalization and pregnancy can occur in about 1 per thousand cases, possibly more even after we clear him with a semen analysis showing no motile sperm. I counseled him on the jorge-operative risks of bleeding, infection, pain.  I described to him post-vasectomy pain syndrome that can occur in about 1 to 2% of men undergoing vasectomy.     We also discussed recovery times (typically days if no complications) and post-operative care including use of ice packs, pain medication and wound care.    Plan  Schedule vasectomy procedure in clinic.       Additional Coding Information:    Problems:  One acute uncomplicated illness or injury    Data Reviewed  Minimal or none    Level of risk:   low risk (e.g., OTC medication or  observation, minor surgery without risks)    Time spent:  10 minutes spent on the date of the encounter doing chart review, history and exam, documentation and further activities as noted above. This included the video chat time above.      Ez Michele MD

## 2023-06-09 NOTE — PROGRESS NOTES
CHRISTY is a 37 year old who is being evaluated via a billable video visit.      How would you like to obtain your AVS? MyChart  If the video visit is dropped, the invitation should be resent by: Text to cell phone: [phone]  Will anyone else be joining your video visit? No        Video-Visit Details    Type of service:  Video Visit       Originating Location (pt. Location): Home    Distant Location (provider location):  On-site  Platform used for Video Visit: Bhupendra     CC: Desires sterilization, consult for vasectomy from Dr. Rosa Maria Yi     HPI: Simon Cuellar is a 37 year old male with 2 children, and he is intersted in getting a vasectomy for sterilization.      No history of bleeding problems in him or family.  History of right torsion age 14. Status-post pexy.    PMH:   Past Medical History:   Diagnosis Date     Childhood asthma      NO ACTIVE PROBLEMS      Past Surgical History:   Procedure Laterality Date     testicular surgery      torsion   he thinks they maybe just fixed the right side.    FAMILY HX:   Family History   Problem Relation Age of Onset     Colon Cancer Paternal Grandmother      Cancer Paternal Grandfather      Diabetes Maternal Grandfather      Lung Cancer Paternal Aunt       No history of coagulopathies, no  malignancies.     ALLERGIES:    No Known Allergies    MEDS:   No prescription medications at this time.    SOCIAL HISTORY:  Social History     Tobacco Use     Smoking status: Some Days     Packs/day: 0.25     Years: 9.00     Pack years: 2.25     Types: Cigarettes     Last attempt to quit: 11/12/2012     Years since quitting: 10.5     Smokeless tobacco: Never   Vaping Use     Vaping status: Never Used   Substance Use Topics     Alcohol use: Yes     Alcohol/week: 0.0 standard drinks of alcohol     Comment: 2-3 drinks per week     Drug use: No        GENERAL PHYSICAL EXAM:   Exam  General- Alert, oriented, nad.  Pleasant and conversant.  Eyes- anicteric, EOMI.  Resps- normal,  non-labored.  No cough  Abdomen-  nondistended.   exam- deferred.   Neurological - no tremors  Skin - no discoloration/ lesions noted  Psychiatric - no anxiety, alert & oriented.       The rest of a comprehensive physical examination is deferred due to video visit restrictions.       I discussed with him at length the risks and benefits of the procedure. He understands that this is a sterilization procedure, and not reversible contraception. He understands that reversals, while possible, are not guaranteed to work and fairly complex. I discussed with him the option of sperm cryopreservation.     I stressed that he continues to be fertile in the post-operative period, and that he should continue using other contraceptive methods, such as a condom, until he obtains a semen analysis and we review the results to confirm success of the procedure and infertility. I also stressed to him that recanalization and pregnancy can occur in about 1 per thousand cases, possibly more even after we clear him with a semen analysis showing no motile sperm. I counseled him on the jorge-operative risks of bleeding, infection, pain.  I described to him post-vasectomy pain syndrome that can occur in about 1 to 2% of men undergoing vasectomy.     We also discussed recovery times (typically days if no complications) and post-operative care including use of ice packs, pain medication and wound care.    Plan  Schedule vasectomy procedure in clinic.       Additional Coding Information:    Problems:  One acute uncomplicated illness or injury    Data Reviewed  Minimal or none    Level of risk:   low risk (e.g., OTC medication or observation, minor surgery without risks)    Time spent:  10 minutes spent on the date of the encounter doing chart review, history and exam, documentation and further activities as noted above. This included the video chat time above.

## 2023-11-07 ENCOUNTER — PRE VISIT (OUTPATIENT)
Dept: UROLOGY | Facility: CLINIC | Age: 38
End: 2023-11-07
Payer: COMMERCIAL

## 2023-11-07 DIAGNOSIS — Z30.09 FAMILY PLANNING: Primary | ICD-10-CM

## 2023-11-07 NOTE — CONFIDENTIAL NOTE
Patient coming in for vasectomy procedure    Sent [employer] message with prep which included:    Stop all blood thinners for seven days prior to procedure  Eat a meal prior to procedure  Shave the hair from the base of the penis and scrotum the night prior to procedure.      Steff Lara  11/07/23  3:46 PM      
none

## 2023-11-08 ENCOUNTER — VIRTUAL VISIT (OUTPATIENT)
Dept: FAMILY MEDICINE | Facility: CLINIC | Age: 38
End: 2023-11-08
Payer: COMMERCIAL

## 2023-11-08 DIAGNOSIS — J30.2 SEASONAL ALLERGIC RHINITIS, UNSPECIFIED TRIGGER: ICD-10-CM

## 2023-11-08 DIAGNOSIS — J45.21 MILD INTERMITTENT ASTHMA WITH ACUTE EXACERBATION: Primary | ICD-10-CM

## 2023-11-08 PROCEDURE — 99213 OFFICE O/P EST LOW 20 MIN: CPT | Mod: VID

## 2023-11-08 RX ORDER — BUDESONIDE AND FORMOTEROL FUMARATE DIHYDRATE 80; 4.5 UG/1; UG/1
2 AEROSOL RESPIRATORY (INHALATION) 2 TIMES DAILY
Qty: 10.2 G | Refills: 0 | Status: SHIPPED | OUTPATIENT
Start: 2023-11-08 | End: 2024-04-26

## 2023-11-08 NOTE — COMMUNITY RESOURCES LIST (ENGLISH)
11/08/2023   [facility] [employer]  N/A  For questions about this resource list or additional care needs, please contact your primary care clinic or care manager.  Phone: [phone]   Email: N/A   Address: [address] [place] [id number]   Hours: N/A        Financial Stability       Utility payment assistance  1  [employer] [facility] - Energy Assistance Distance: 1.04 miles      Phone/Virtual   [address] [place] [address]  Language: English, Hmong, Anguillan, Ecuadorean  Hours: Mon - Fri 8:00 AM - 4:30 PM  Fees: Free   Phone: (455) 310-9927 Email: info@caprw.org Website: http://www.caprw.org/     2  [facility] Distance: 3.42 miles      Phone/Virtual   [address] [place] [address]  Language: English, Hmong, Somali, Ecuadorean  Hours: Mon - Fri 10:00 AM - 3:00 PM  Fees: Free   Phone: (355) 130-3661 Email: Robbi@[email or web address].[email or web address] Website: [email or web address]          Important Numbers & Websites       Emergency Services   911  City Services   311  Poison Control   (900) 136-3377  Suicide Prevention Lifeline   (917) 963-6895 (TALK)  Child Abuse Hotline   (230) 802-8596 (4-A-Child)  Sexual Assault Hotline   (827) 122-6887 (HOPE)  National Runaway Safeline   (894) 311-3356 (RUNAWAY)  All-Options Talkline   (681) 416-5463  Substance Abuse Referral   (795) 459-1811 (HELP)

## 2023-11-08 NOTE — PATIENT INSTRUCTIONS
Your shortness of breath is most likely being caused by asthma.  Asthma is a condition of the lungs that is caused by both inflammation, and obstruction of the airways leading to your lungs.  Asthma symptoms can be aggravated by a number of things including environmental pollutants or allergens.  It sounds like your asthma may be flared up because of environmental allergies, which is not uncommon.     Because her symptoms are mainly flared up at night, it is okay for the time being to treat your symptoms as needed.  You can continue to take your daily allergy medication to manage seasonal allergies that might be leading to shortness of breath.    I have also prescribed a Symbicort inhaler that you can take as needed for acute episodes of shortness of breath.  As we discussed, you can take 2 puffs when you are feeling short of breath, give this a little bit of time to work, and then you may repeat another 2 puffs if you are still feeling short of breath.  If you are needing more than 4 puffs of your inhaler without symptom resolution, it is a good idea to be seen in the clinic    You can also  a nasal saline spray over-the-counter.  This can often help to flush out the sinuses, and create easier air passage through your nose.    Please follow-up about the symptoms with your primary care provider.  If you are having symptoms of increased need for your inhaler use, increased nighttime awakening, or increased shortness of breath with normal daily activity, it may be time to discuss escalation in your asthma action plan.    Please seek immediate medical attention with concerns for shortness of breath that is unresolved with medication, chest pain, palpitations, lightheadedness or dizziness.

## 2023-11-08 NOTE — PROGRESS NOTES
CHRISTY is a 38 year old who is being evaluated via a billable video visit.      How would you like to obtain your AVS? MyChart  If the video visit is dropped, the invitation should be resent by: Text to cell phone: [phone]  Will anyone else be joining your video visit? No          Assessment & Plan     (J45.21) Mild intermittent asthma with acute exacerbation  (primary encounter diagnosis)  Comment: Nighttime awakening twice in the evening.  No daytime exacerbations.  No acute episodes requiring hospitalization.  Will able to maintain daily activity.  History of childhood asthma, which she based out of into adulthood.  Likely being exacerbated by seasonal allergies, and environmental pollutants.  Justifiable to begin with as needed asthma treatment rather than a controller inhaler.  Will reevaluate need for escalating plan of care based on frequency and severity of exacerbations.  Need to consider using Symbicort daily as a controller option, if symptoms worsen or do not improve.  Plan: budesonide-formoterol (SYMBICORT) 80-4.5         MCG/ACT Inhaler        -Monitor symptom severity and frequency, and be seen in person with wheezing or shortness of breath that does not resolve with current plan of care  -Follow-up with PCP at next preventative health visit to discuss symptoms status and further management    (J30.2) Seasonal allergic rhinitis, unspecified trigger  Comment: Nasal and sinus congestion as well as increased wheezing and shortness of breath exacerbated mostly by dust and environmental allergens per patient.  Patient notes symptoms have improved with daily dosing of Allegra.   Plan: Continue daily dosing of Allegra  -Begin the use of a nasal saline spray to encourage sinus drainage.  -Can consider nasal antihistamine rather than oral Allegra if he finds that this works better         Nicotine/Tobacco Cessation:  He reports that he has been smoking cigarettes. He has a 2.25 pack-year smoking history. He  has never used smokeless tobacco.  Nicotine/Tobacco Cessation Plan:   Information offered: Patient not interested at this time      Patient Instructions   Your shortness of breath is most likely being caused by asthma.  Asthma is a condition of the lungs that is caused by both inflammation, and obstruction of the airways leading to your lungs.  Asthma symptoms can be aggravated by a number of things including environmental pollutants or allergens.  It sounds like your asthma may be flared up because of environmental allergies, which is not uncommon.     Because her symptoms are mainly flared up at night, it is okay for the time being to treat your symptoms as needed.  You can continue to take your daily allergy medication to manage seasonal allergies that might be leading to shortness of breath.    I have also prescribed a Symbicort inhaler that you can take as needed for acute episodes of shortness of breath.  As we discussed, you can take 2 puffs when you are feeling short of breath, give this a little bit of time to work, and then you may repeat another 2 puffs if you are still feeling short of breath.  If you are needing more than 4 puffs of your inhaler without symptom resolution, it is a good idea to be seen in the clinic    You can also  a nasal saline spray over-the-counter.  This can often help to flush out the sinuses, and create easier air passage through your nose.    Please follow-up about the symptoms with your primary care provider.  If you are having symptoms of increased need for your inhaler use, increased nighttime awakening, or increased shortness of breath with normal daily activity, it may be time to discuss escalation in your asthma action plan.    Please seek immediate medical attention with concerns for shortness of breath that is unresolved with medication, chest pain, palpitations, lightheadedness or dizziness.    DARIUS Desir [facility]  "MIDWAY    Subjective   DRWE is a 38 year old, presenting for the following health issues:  office visit and Recheck Medication (Pt reports that he is experiencing shortness of breath for about 2 months every morning around 4am.)      11/8/2023    10:55 AM   Additional Questions   Roomed by esther   Accompanied by alone         11/8/2023    10:55 AM   Patient Reported Additional Medications   Patient reports taking the following new medications none     Drew is a 38-year-old previously healthy male who presents today with concerns for shortness of breath at night.  Patient reports that he had a diagnosis of childhood asthma and allergies for which he took medication as a child and into adolescence.  He notes that he phased out of this medication into adulthood, and has not had respiratory concerns up until this year.  Patient notes that he began having concerns for shortness of breath and seasonal allergies at the beginning of this year and notes that it has since become progressively worse.  He notes that he was waking up with shortness of breath at night about 1 time a month in the beginning of the year, and it is now progressed to nighttime wakening a couple of times a week.  Patient notes that he generally wakes up around 4 AM with a major symptom of shortness of breath as though he feels like he cannot get a full breath.  He notes that he feels panicked during these times, but does not feel that it is a symptom of feeling \"out of breath \"like he might feel after running or doing physical activity.  This prompted patient to sit up in bed, but he notes that it does not always resolve the shortness of breath, and generally has to get out of bed and begin his day and wait for the shortness of breath to resolve on its own.  He denies any chest pain, palpitations, lightheadedness, or cough with these episodes.  He does however attest to wheezing with episode just shortness of breath.  Patient denies any daytime " symptoms, and reports that he is still able to do his normal daily activities including working in the yard or riding on his Peloton.  He does feel that symptoms are largely exacerbated by environmental allergens and specifically dust.  He has been taking an over-the-counter antihistamine daily for the past couple of weeks, and feels that it is helping to improve his symptoms.  He denies any recent acute illness.  He is not currently taking any other medications for his symptoms.    History of Present Illness       Reason for visit:  Trouble breathing/weazing overnight  Symptom onset:  More than a month  Symptoms include:  I wake up 3-5x a week with trouble breathing  Symptom intensity:  Moderate  Symptom progression:  Worsening  Had these symptoms before:  Yes  Has tried/received treatment for these symptoms:  Yes  Previous treatment was successful:  Yes  Prior treatment description:  I had asthma and allergies as a kid and it feels like that  What makes it worse:  Dust/ changing of the season  What makes it better:  Allergy pill before bed    He eats 4 or more servings of fruits and vegetables daily.He consumes 0 sweetened beverage(s) daily.He exercises with enough effort to increase his heart rate 20 to 29 minutes per day.  He exercises with enough effort to increase his heart rate 4 days per week.   He is taking medications regularly.      Review of Systems   Constitutional, HEENT, cardiovascular, pulmonary, gi and gu systems are negative, except as otherwise noted.      Objective       Vitals:  No vitals were obtained today due to virtual visit.    Physical Exam   GENERAL: Healthy, alert and no distress  EYES: Eyes grossly normal to inspection.  No discharge or erythema, or obvious scleral/conjunctival abnormalities.  RESP: No audible wheeze, cough, or visible cyanosis.  No visible retractions or increased work of breathing.    SKIN: Visible skin clear. No significant rash, abnormal pigmentation or  lesions.  NEURO: Cranial nerves grossly intact.  Mentation and speech appropriate for age.  PSYCH: Mentation appears normal, affect normal/bright, judgement and insight intact, normal speech and appearance well-groomed.      Snehal Valdes DNP FNP-C  Family Nurse Practitioner - Same Day Provider  [facility]          Video-Visit Details    Type of service:  Video Visit     Originating Location (pt. Location): Home    Distant Location (provider location):  On-site  Platform used for Video Visit: Bhupendra

## 2023-11-10 ENCOUNTER — IMMUNIZATION (OUTPATIENT)
Dept: FAMILY MEDICINE | Facility: CLINIC | Age: 38
End: 2023-11-10
Payer: COMMERCIAL

## 2023-11-10 DIAGNOSIS — Z23 ENCOUNTER FOR IMMUNIZATION: Primary | ICD-10-CM

## 2023-11-10 PROCEDURE — 90471 IMMUNIZATION ADMIN: CPT

## 2023-11-10 PROCEDURE — 91320 SARSCV2 VAC 30MCG TRS-SUC IM: CPT

## 2023-11-10 PROCEDURE — 99207 PR NO CHARGE NURSE ONLY: CPT

## 2023-11-10 PROCEDURE — 90686 IIV4 VACC NO PRSV 0.5 ML IM: CPT

## 2023-11-10 PROCEDURE — 90480 ADMN SARSCOV2 VAC 1/ONLY CMP: CPT

## 2023-11-10 NOTE — PROGRESS NOTES
Prior to immunization administration, verified patients identity using patient s name and date of birth. Please see Immunization Activity for additional information.     Screening Questionnaire for Adult Immunization    Are you sick today?   No   Do you have allergies to medications, food, a vaccine component or latex?   No   Have you ever had a serious reaction after receiving a vaccination?   No   Do you have a long-term health problem with heart, lung, kidney, or metabolic disease (e.g., diabetes), asthma, a blood disorder, no spleen, complement component deficiency, a cochlear implant, or a spinal fluid leak?  Are you on long-term aspirin therapy?   No   Do you have cancer, leukemia, HIV/AIDS, or any other immune system problem?   No   Do you have a parent, brother, or sister with an immune system problem?   No   In the past 3 months, have you taken medications that affect  your immune system, such as prednisone, other steroids, or anticancer drugs; drugs for the treatment of rheumatoid arthritis, Crohn s disease, or psoriasis; or have you had radiation treatments?   No   Have you had a seizure, or a brain or other nervous system problem?   No   During the past year, have you received a transfusion of blood or blood    products, or been given immune (gamma) globulin or antiviral drug?   No   For women: Are you pregnant or is there a chance you could become       pregnant during the next month?   No   Have you received any vaccinations in the past 4 weeks?   No     Immunization questionnaire answers were all negative.    I have reviewed the following standing orders:   This patient is due and qualifies for the Covid-19 vaccine.     Click here for COVID-19 Standing Order    I have reviewed the vaccines inclusion and exclusion criteria; No concerns regarding eligibility.     This patient is due and qualifies for the Influenza vaccine.    Click here for Influenza Vaccine Standing Order    I have reviewed the  vaccines inclusion and exclusion criteria; No concerns regarding eligibility.     Patient instructed to remain in clinic for 15 minutes afterwards, and to report any adverse reactions.     Screening performed by SHAYY Rahman MA on 11/10/2023 at 3:01 PM.

## 2023-11-24 ENCOUNTER — OFFICE VISIT (OUTPATIENT)
Dept: UROLOGY | Facility: CLINIC | Age: 38
End: 2023-11-24
Payer: COMMERCIAL

## 2023-11-24 VITALS
HEIGHT: 70 IN | BODY MASS INDEX: 20.76 KG/M2 | DIASTOLIC BLOOD PRESSURE: 74 MMHG | SYSTOLIC BLOOD PRESSURE: 113 MMHG | WEIGHT: 145 LBS | HEART RATE: 56 BPM

## 2023-11-24 DIAGNOSIS — Z30.2 ENCOUNTER FOR VASECTOMY: Primary | ICD-10-CM

## 2023-11-24 PROCEDURE — 55250 REMOVAL OF SPERM DUCT(S): CPT | Performed by: UROLOGY

## 2023-11-24 ASSESSMENT — PAIN SCALES - GENERAL: PAINLEVEL: NO PAIN (0)

## 2023-11-24 NOTE — PROGRESS NOTES
Procedure: Vasectomy bilateral.   Anesthesia: Local lidocaine injection by surgeon.  Surgeon: RENEE Michele M.D.   Assistant:  urology nursing staff present    Description of procedure: The patient has received education regarding vasectomy, including risks and benefits, we obtaiined consent and proceeded with the surgery. He understands that there is a risk of late failure from recanalization. He understands that he is fertile until he has completed one or more semen analyses and he is given clearance from us for unprotected intercourse.  He understands that we will contact him regarding the results of the post-vasectomy semen analysis, but it is his responsibility to make sure he is cleared before discontinuing other birth control methods.  I have discussed with him other risks including bleeding, infection, acute and possible long-term pain.    The right vas was ligated first.  This was done using the standard technique by grasping it with a three-finger  and lifting it up to the skin.  A small amount of lidocaine was infiltrated into the skin and vasal sheath.  The skin was punctured and dilated bluntly using the scalpel-less dissector.  The sheath was identified and a rigid clamp was passed around the vas which was then lifted out of the incision.  The vas was cleaned off from the deferential vessels and the sheath, and cautery was used to divide the vas between mosquitos.  A small segment was removed and discarded.  The lumen of the vas was cannulated to confirm its identity, and the lumens of both ends were cauterized.  Pen cautery was used sparingly/as needed for minor bleeders.  A facial interposition was then performed with a single suture of 4-0 chromic. The skin defect was closed with a 4-0 chromic interrupted suture after confirming adequate hemostasis.       We then turned our attention to the left side and a similar technique was performed. This involved division, excision of a segment, cautery of the  lumens, and fascial interposition.    There were no hematomas noted at the end of the procedure.  The patient tolerated the procedure well.    He was advised to return for a post vasectomy semen check in 2-3 months, and that he is not sterile and must continue to use contraception until we tell him otherwise (based on follow-up semen specimen(s)).    Plan:  -Post vasectomy semen analysis in 2-3 months   -ice packs to scrotum X 24h  -shower ok tomorrow  -OTC analgesics recommended     Anika FAM

## 2023-11-24 NOTE — LETTER
11/24/2023       RE: Simon Cuellar  [address]  [place] [address]     Dear Colleague,    Thank you for referring your patient, Simon Cuellar, to the [facility] [place] [facility]. Please see a copy of my visit note below.    Procedure: Vasectomy bilateral.   Anesthesia: Local lidocaine injection by surgeon.  Surgeon: RENEE Michele M.D.   Assistant:  urology nursing staff present    Description of procedure: The patient has received education regarding vasectomy, including risks and benefits, we obtaiined consent and proceeded with the surgery. He understands that there is a risk of late failure from recanalization. He understands that he is fertile until he has completed one or more semen analyses and he is given clearance from us for unprotected intercourse.  He understands that we will contact him regarding the results of the post-vasectomy semen analysis, but it is his responsibility to make sure he is cleared before discontinuing other birth control methods.  I have discussed with him other risks including bleeding, infection, acute and possible long-term pain.    The right vas was ligated first.  This was done using the standard technique by grasping it with a three-finger  and lifting it up to the skin.  A small amount of lidocaine was infiltrated into the skin and vasal sheath.  The skin was punctured and dilated bluntly using the scalpel-less dissector.  The sheath was identified and a rigid clamp was passed around the vas which was then lifted out of the incision.  The vas was cleaned off from the deferential vessels and the sheath, and cautery was used to divide the vas between mosquitos.  A small segment was removed and discarded.  The lumen of the vas was cannulated to confirm its identity, and the lumens of both ends were cauterized.  Pen cautery was used sparingly/as needed for minor bleeders.  A facial  interposition was then performed with a single suture of 4-0 chromic. The skin defect was closed with a 4-0 chromic interrupted suture after confirming adequate hemostasis.       We then turned our attention to the left side and a similar technique was performed. This involved division, excision of a segment, cautery of the lumens, and fascial interposition.    There were no hematomas noted at the end of the procedure.  The patient tolerated the procedure well.    He was advised to return for a post vasectomy semen check in 2-3 months, and that he is not sterile and must continue to use contraception until we tell him otherwise (based on follow-up semen specimen(s)).    Plan:  -Post vasectomy semen analysis in 2-3 months   -ice packs to scrotum X 24h  -shower ok tomorrow  -OTC analgesics recommended     Anika FAM

## 2023-11-24 NOTE — NURSING NOTE
"Chief Complaint   Patient presents with    Sterilization       Blood pressure 113/74, pulse 56, height 1.778 m (5' 10\"), weight 65.8 kg (145 lb). Body mass index is 20.81 kg/m .    There is no problem list on file for this patient.      No Known Allergies    Current Outpatient Medications   Medication Sig Dispense Refill    budesonide-formoterol (SYMBICORT) 80-4.5 MCG/ACT Inhaler Inhale 2 puffs into the lungs 2 times daily for 90 days 10.2 g 0       Social History     Tobacco Use    Smoking status: Some Days     Packs/day: 0.25     Years: 9.00     Additional pack years: 0.00     Total pack years: 2.25     Types: Cigarettes     Last attempt to quit: 2012     Years since quittin.0    Smokeless tobacco: Never   Vaping Use    Vaping Use: Never used   Substance Use Topics    Alcohol use: Yes     Alcohol/week: 0.0 standard drinks of alcohol     Comment: 2-3 drinks per week    Drug use: No       Invasive Procedure Safety Checklist:    Procedure: Bilateral Vasectomy    Action: Complete sections and checkboxes as appropriate.    Pre-procedure:  1. Patient ID Verified with 2 identifiers (Vanessa and  or MRN) : YES    2. Procedure and site verified with patient/designee (when able) : YES    3. Accurate consent documentation in medical record : YES    4. H&P (or appropriate assessment) documented in medical record : N/A  H&P must be up to 30 days prior to procedure an updated within 24 hours of                 Procedure as applicable.     5. Relevant diagnostic and radiology test results appropriately labeled and displayed as applicable : YES    6. Blood products, implants, devices, and/or special equipment available for the procedure as applicable : YES    7. Procedure site(s) marked with provider initials [Exclusions: none] : NO    8. Marking not required. Reason : Yes  Procedure does not require site marking    Time Out:     Time-Out performed immediately prior to starting procedure, including verbal and active " participation of all team members addressing: YES    1. Correct patient identity.  2. Confirmed that the correct side and site are marked.  3. An accurate procedure to be done.  4. Agreement on the procedure to be done.  5. Correct patient position.  6. Relevant images and results are properly labeled and appropriately displayed.  7. The need to administer antibiotics or fluids for irrigation purposes during the procedure as applicable.  8. Safety precautions based on patient history or medication use.    During Procedure: Verification of correct person, site, and procedure occurs any time the responsibility for care of the patient is transferred to another member of the care team.    The following medication was given:     MEDICATION:  Lidocaine without epinephrine 2%  ROUTE: administered by physician - scrotal   SITE: administered by physician - scrotal   DOSE: 10 mL  LOT #: [id number]  : [employer]  EXPIRATION DATE: oct 2024  NDC#: 55150-254-10   Was there drug waste? Yes  Amount of drug waste (mL): 10 mL.  Reason for waste:  Single use vial    Prior to injection, verified patient identity using patient's name and date of birth.  Due to injection administration, patient instructed to remain in clinic for 15 minutes  afterwards, and to report any adverse reaction to me immediately.    Drug Amount Wasted:  Yes: 10 mL (20 mg/ml)  Vial/Syringe: Single dose vial      Inez Mello  11/24/2023  9:05 AM

## 2024-03-06 ENCOUNTER — PATIENT OUTREACH (OUTPATIENT)
Dept: CARE COORDINATION | Facility: CLINIC | Age: 39
End: 2024-03-06
Payer: COMMERCIAL

## 2024-03-20 ENCOUNTER — PATIENT OUTREACH (OUTPATIENT)
Dept: CARE COORDINATION | Facility: CLINIC | Age: 39
End: 2024-03-20
Payer: COMMERCIAL

## 2024-04-03 ENCOUNTER — LAB (OUTPATIENT)
Dept: LAB | Facility: CLINIC | Age: 39
End: 2024-04-03
Payer: COMMERCIAL

## 2024-04-03 DIAGNOSIS — Z30.2 ENCOUNTER FOR VASECTOMY: ICD-10-CM

## 2024-04-03 PROCEDURE — 89260 SPERM ISOLATION SIMPLE: CPT

## 2024-04-04 DIAGNOSIS — Z98.52 S/P VASECTOMY: Primary | ICD-10-CM

## 2024-04-04 LAB
ABSTINENCE DAYS: 7 DAYS (ref 2–7)
AGGLUTINATION: NO
ANALYSIS TEMP - CENTIGRADE: 21 CENTIGRADE
COLLECTION METHOD: NORMAL
COLLECTION SITE: NORMAL
CONSENT TO RELEASE TO PARTNER: YES
DAL- RECEIVED TIME: NORMAL
LIQUEFIED: YES
ROUND CELLS: 0 MILLION/ML
SPECIMEN PH: 7.2 PH
SPECIMEN VOLUME: 9.4 ML
SPERM CONCENTRATION: 0 MILLION/ML
TIME OF ANALYSIS: NORMAL
TOTAL SPERM NUMBER: 0 MILLION
VISCOUS: NO

## 2024-04-04 NOTE — RESULT ENCOUNTER NOTE
Dear CHRISTY     Your post-vasectomy semen analysis shows very rare presence of a few non-motile (dead) sperm.     This sample meets current criteria for success (under 100,000/mL non-motile sperm).     The chance of pregnancy for men with this result is the same as if zero sperm were seen (about 1 in 2000) so you're OK for unprotected intercourse if you are OK with these risks.     If you want to be extra careful we can have you repeat a semen analysis in a month, to confirm results.  You can call [facility] [phone] to schedule at your convenience.     Thank You   Let me know if you have any questions.     Anika FAM

## 2024-04-26 ENCOUNTER — OFFICE VISIT (OUTPATIENT)
Dept: FAMILY MEDICINE | Facility: CLINIC | Age: 39
End: 2024-04-26
Payer: COMMERCIAL

## 2024-04-26 VITALS
TEMPERATURE: 97.9 F | HEIGHT: 70 IN | OXYGEN SATURATION: 97 % | WEIGHT: 144.4 LBS | DIASTOLIC BLOOD PRESSURE: 60 MMHG | HEART RATE: 94 BPM | SYSTOLIC BLOOD PRESSURE: 110 MMHG | BODY MASS INDEX: 20.67 KG/M2

## 2024-04-26 DIAGNOSIS — Z00.00 ROUTINE GENERAL MEDICAL EXAMINATION AT A HEALTH CARE FACILITY: Primary | ICD-10-CM

## 2024-04-26 DIAGNOSIS — Z13.39 ATTENTION DEFICIT HYPERACTIVITY DISORDER (ADHD) EVALUATION: ICD-10-CM

## 2024-04-26 DIAGNOSIS — J45.21 MILD INTERMITTENT ASTHMA WITH ACUTE EXACERBATION: ICD-10-CM

## 2024-04-26 DIAGNOSIS — Z80.0 FAMILY HISTORY OF COLON CANCER: ICD-10-CM

## 2024-04-26 DIAGNOSIS — Z13.1 ENCOUNTER FOR SCREENING FOR DIABETES MELLITUS: ICD-10-CM

## 2024-04-26 LAB
ANION GAP SERPL CALCULATED.3IONS-SCNC: 11 MMOL/L (ref 7–15)
BUN SERPL-MCNC: 13 MG/DL (ref 6–20)
CALCIUM SERPL-MCNC: 9.5 MG/DL (ref 8.6–10)
CHLORIDE SERPL-SCNC: 101 MMOL/L (ref 98–107)
CREAT SERPL-MCNC: 1.14 MG/DL (ref 0.67–1.17)
DEPRECATED HCO3 PLAS-SCNC: 27 MMOL/L (ref 22–29)
EGFRCR SERPLBLD CKD-EPI 2021: 84 ML/MIN/1.73M2
GLUCOSE SERPL-MCNC: 83 MG/DL (ref 70–99)
POTASSIUM SERPL-SCNC: 4.2 MMOL/L (ref 3.4–5.3)
SODIUM SERPL-SCNC: 139 MMOL/L (ref 135–145)

## 2024-04-26 PROCEDURE — 80048 BASIC METABOLIC PNL TOTAL CA: CPT | Performed by: NURSE PRACTITIONER

## 2024-04-26 PROCEDURE — 36415 COLL VENOUS BLD VENIPUNCTURE: CPT | Performed by: NURSE PRACTITIONER

## 2024-04-26 PROCEDURE — 99213 OFFICE O/P EST LOW 20 MIN: CPT | Mod: 25 | Performed by: NURSE PRACTITIONER

## 2024-04-26 PROCEDURE — 99395 PREV VISIT EST AGE 18-39: CPT | Performed by: NURSE PRACTITIONER

## 2024-04-26 RX ORDER — BUDESONIDE AND FORMOTEROL FUMARATE DIHYDRATE 80; 4.5 UG/1; UG/1
2 AEROSOL RESPIRATORY (INHALATION) 2 TIMES DAILY
Qty: 10.2 G | Refills: 0 | Status: SHIPPED | OUTPATIENT
Start: 2024-04-26

## 2024-04-26 SDOH — HEALTH STABILITY: PHYSICAL HEALTH: ON AVERAGE, HOW MANY DAYS PER WEEK DO YOU ENGAGE IN MODERATE TO STRENUOUS EXERCISE (LIKE A BRISK WALK)?: 5 DAYS

## 2024-04-26 ASSESSMENT — ASTHMA QUESTIONNAIRES
QUESTION_5 LAST FOUR WEEKS HOW WOULD YOU RATE YOUR ASTHMA CONTROL: COMPLETELY CONTROLLED
QUESTION_2 LAST FOUR WEEKS HOW OFTEN HAVE YOU HAD SHORTNESS OF BREATH: NOT AT ALL
QUESTION_3 LAST FOUR WEEKS HOW OFTEN DID YOUR ASTHMA SYMPTOMS (WHEEZING, COUGHING, SHORTNESS OF BREATH, CHEST TIGHTNESS OR PAIN) WAKE YOU UP AT NIGHT OR EARLIER THAN USUAL IN THE MORNING: TWO OR THREE NIGHTS A WEEK
ACT_TOTALSCORE: 20
ACT_TOTALSCORE: 20
QUESTION_4 LAST FOUR WEEKS HOW OFTEN HAVE YOU USED YOUR RESCUE INHALER OR NEBULIZER MEDICATION (SUCH AS ALBUTEROL): TWO OR THREE TIMES PER WEEK
QUESTION_1 LAST FOUR WEEKS HOW MUCH OF THE TIME DID YOUR ASTHMA KEEP YOU FROM GETTING AS MUCH DONE AT WORK, SCHOOL OR AT HOME: NONE OF THE TIME

## 2024-04-26 ASSESSMENT — PAIN SCALES - GENERAL: PAINLEVEL: NO PAIN (0)

## 2024-04-26 ASSESSMENT — SOCIAL DETERMINANTS OF HEALTH (SDOH): HOW OFTEN DO YOU GET TOGETHER WITH FRIENDS OR RELATIVES?: ONCE A WEEK

## 2024-04-26 NOTE — PROGRESS NOTES
Preventive Care Visit  [facility]  DARIUS Summers CNP, Family Medicine  Apr 26, 2024      Assessment & Plan     Routine general medical examination at a health care facility  Preventative exam w/no abnormalities and/or concerns listed in diagnoses; discussed health maintenance screenings including prostate, breast, cervical and colorectal ca screenings related to gender;  reviewed and reconciled medication, medical history and patient related health concerns  Plan: obtain metabolic labs      Mild intermittent asthma with acute exacerbation  Stable; continue current regimen; renewed medication  - budesonide-formoterol (SYMBICORT) 80-4.5 MCG/ACT Inhaler  Dispense: 10.2 g; Refill: 0    Attention deficit hyperactivity disorder (ADHD) evaluation  Referral for testing; concerns since childhood; poor organizational skills; hyperactive; procrastination    - Adult Mental Health  Referral    Encounter for screening for diabetes mellitus  Hemoglobin A1C   Date Value Ref Range Status   03/15/2023 5.2 0.0 - 5.6 % Final     Comment:     Normal <5.7%   Prediabetes 5.7-6.4%    Diabetes 6.5% or higher     Note: Adopted from ADA consensus guidelines.   - Basic metabolic panel    Family history of colon cancer   paternal GM age 50s;   Discussed colon screening age 40-45; 2nd degree relative      Counseling  Appropriate preventive services were discussed with this patient, including applicable screening as appropriate for fall prevention, nutrition, physical activity, Tobacco-use cessation, weight loss and cognition.  Checklist reviewing preventive services available has been given to the patient.  Reviewed patient's diet, addressing concerns and/or questions.           Naila HARRISON is a 38 year old, presenting for the following:  Physical (/RX medications/Discuss shingles vaccine, had shingles in the PT/ADHD testing)        4/26/2024    10:29 AM   Additional Questions   Roomed by ZAHRAA Combs    Accompanied by Self        Health Care Directive  Patient does not have a Health Care Directive or Living Will: Discussed advance care planning with patient; information given to patient to review.    HPI  38 year old year old male  with PMH in clinic for preventive health care exam.     Paternal GM: colon ca late 50-60: early screening age 40   ADHD: forgetful; poor orga; impu; fidgety; hyperactive as a child; poor attention during early school; procrastination;   Scarlet fever as child ~ 5-6 age   Shingles several years ago     In addition to the preventive visit, 30  minutes of the appointment were spent evaluating and developing a treatment plan for his additional concern(s).                  4/26/2024   General Health   How would you rate your overall physical health? Good   Feel stress (tense, anxious, or unable to sleep) Rather much   (!) STRESS CONCERN      4/26/2024   Nutrition   Three or more servings of calcium each day? Yes   Diet: Regular (no restrictions)   How many servings of fruit and vegetables per day? 4 or more   How many sweetened beverages each day? 0-1         4/26/2024   Exercise   Days per week of moderate/strenous exercise 5 days         4/26/2024   Social Factors   Frequency of gathering with friends or relatives Once a week   Worry food won't last until get money to buy more No   Food not last or not have enough money for food? No   Do you have housing?  Yes   Are you worried about losing your housing? No   Lack of transportation? No   Unable to get utilities (heat,electricity)? Yes   Want help with housing or utility concern? No   (!) FINANCIAL RESOURCE STRAIN CONCERN      4/26/2024   Dental   Dentist two times every year? Yes         4/26/2024   TB Screening   Were you born outside of the [place]? No         Today's PHQ-2 Score:       4/26/2024    10:23 AM   PHQ-2 ( 1999 Pf[employer])   Q1: Little interest or pleasure in doing things 1   Q2: Feeling down, depressed or hopeless 1   PHQ-2 Score 2  "  Q1: Little interest or pleasure in doing things Several days   Q2: Feeling down, depressed or hopeless Several days   PHQ-2 Score 2           2024   Substance Use   Alcohol more than 3/day or more than 7/wk No   Do you use any other substances recreationally? (!) CANNABIS PRODUCTS     Social History     Tobacco Use    Smoking status: Some Days     Current packs/day: 0.00     Average packs/day: 0.3 packs/day for 9.0 years (2.3 ttl pk-yrs)     Types: Cigarettes     Start date: 2003     Last attempt to quit: 2012     Years since quittin.4    Smokeless tobacco: Never   Vaping Use    Vaping status: Never Used   Substance Use Topics    Alcohol use: Yes     Alcohol/week: 0.0 standard drinks of alcohol     Comment: 2-3 drinks per week    Drug use: No           2024   STI Screening   New sexual partner(s) since last STI/HIV test? No        Reviewed and updated as needed this visit by Provider                             Objective    Exam  /60 (BP Location: Right arm, Patient Position: Sitting, Cuff Size: Adult Regular)   Pulse 94   Temp 97.9  F (36.6  C) (Temporal)   Ht 1.77 m (5' 9.69\")   Wt 65.5 kg (144 lb 6.4 oz)   SpO2 97%   BMI 20.91 kg/m     Estimated body mass index is 20.91 kg/m  as calculated from the following:    Height as of this encounter: 1.77 m (5' 9.69\").    Weight as of this encounter: 65.5 kg (144 lb 6.4 oz).    Physical Exam          Signed Electronically by: DARIUS Summers CNP    "

## 2024-04-26 NOTE — PATIENT INSTRUCTIONS
Preventive Care Advice   This is general advice given by our system to help you stay healthy. However, your care team may have specific advice just for you. Please talk to your care team about your preventive care needs.  Nutrition  Eat 5 or more servings of fruits and vegetables each day.  Try wheat bread, brown rice and whole grain pasta (instead of white bread, rice, and pasta).  Get enough calcium and vitamin D. Check the label on foods and aim for 100% of the RDA (recommended daily allowance).  Lifestyle  Exercise at least 150 minutes each week   (30 minutes a day, 5 days a week).  Do muscle strengthening activities 2 days a week. These help control your weight and prevent disease.  No smoking.  Wear sunscreen to prevent skin cancer.  Have a dental exam and cleaning every 6 months.  Yearly exams  See your health care team every year to talk about:  Any changes in your health.  Any medicines your care team has prescribed.  Preventive care, family planning, and ways to prevent chronic diseases.  Shots (vaccines)   HPV shots (up to age 26), if you've never had them before.  Hepatitis B shots (up to age 59), if you've never had them before.  COVID-19 shot: Get this shot when it's due.  Flu shot: Get a flu shot every year.  Tetanus shot: Get a tetanus shot every 10 years.  Pneumococcal, hepatitis A, and RSV shots: Ask your care team if you need these based on your risk.  Shingles shot (for age 50 and up).  General health tests  Diabetes screening:  Starting at age 35, Get screened for diabetes at least every 3 years.  If you are younger than age 35, ask your care team if you should be screened for diabetes.  Cholesterol test: At age 39, start having a cholesterol test every 5 years, or more often if advised.  Bone density scan (DEXA): At age 50, ask your care team if you should have this scan for osteoporosis (brittle bones).  Hepatitis C: Get tested at least once in your life.  STIs (sexually transmitted  infections)  Before age 24: Ask your care team if you should be screened for STIs.  After age 24: Get screened for STIs if you're at risk. You are at risk for STIs (including HIV) if:  You are sexually active with more than one person.  You don't use condoms every time.  You or a partner was diagnosed with a sexually transmitted infection.  If you are at risk for HIV, ask about PrEP medicine to prevent HIV.  Get tested for HIV at least once in your life, whether you are at risk for HIV or not.  Cancer screening tests  Cervical cancer screening: If you have a cervix, begin getting regular cervical cancer screening tests at age 21. Most people who have regular screenings with normal results can stop after age 65. Talk about this with your provider.  Breast cancer scan (mammogram): If you've ever had breasts, begin having regular mammograms starting at age 40. This is a scan to check for breast cancer.  Colon cancer screening: It is important to start screening for colon cancer at age 45.  Have a colonoscopy test every 10 years (or more often if you're at risk) Or, ask your provider about stool tests like a FIT test every year or Cologuard test every 3 years.  To learn more about your testing options, visit: https://www.clypd/428498.pdf.  For help making a decision, visit: https://bit.ly/rn13356.  Prostate cancer screening test: If you have a prostate and are age 55 to 69, ask your provider if you would benefit from a yearly prostate cancer screening test.  Lung cancer screening: If you are a current or former smoker age 50 to 80, ask your care team if ongoing lung cancer screenings are right for you.  For informational purposes only. Not to replace the advice of your health care provider. Copyright   2023 [employer]. All rights reserved. Clinically reviewed by the [facility] Transitions Program. [employer] 132394 - REV 01/24.    Learning About Stress  What is stress?     Stress is your  body's response to a hard situation. Your body can have a physical, emotional, or mental response. Stress is a fact of life for most people, and it affects everyone differently. What causes stress for you may not be stressful for someone else.  A lot of things can cause stress. You may feel stress when you go on a job interview, take a test, or run a race. This kind of short-term stress is normal and even useful. It can help you if you need to work hard or react quickly. For example, stress can help you finish an important job on time.  Long-term stress is caused by ongoing stressful situations or events. Examples of long-term stress include long-term health problems, ongoing problems at work, or conflicts in your family. Long-term stress can harm your health.  How does stress affect your health?  When you are stressed, your body responds as though you are in danger. It makes hormones that speed up your heart, make you breathe faster, and give you a burst of energy. This is called the fight-or-flight stress response. If the stress is over quickly, your body goes back to normal and no harm is done.  But if stress happens too often or lasts too long, it can have bad effects. Long-term stress can make you more likely to get sick, and it can make symptoms of some diseases worse. If you tense up when you are stressed, you may develop neck, shoulder, or low back pain. Stress is linked to high blood pressure and heart disease.  Stress also harms your emotional health. It can make you gatica, tense, or depressed. Your relationships may suffer, and you may not do well at work or school.  What can you do to manage stress?  You can try these things to help manage stress:   Do something active. Exercise or activity can help reduce stress. Walking is a great way to get started. Even everyday activities such as housecleaning or yard work can help.  Try yoga or hannah chi. These techniques combine exercise and meditation. You may need  some training at first to learn them.  Do something you enjoy. For example, listen to music or go to a movie. Practice your hobby or do volunteer work.  Meditate. This can help you relax, because you are not worrying about what happened before or what may happen in the future.  Do guided imagery. Imagine yourself in any setting that helps you feel calm. You can use online videos, books, or a teacher to guide you.  Do breathing exercises. For example:  From a standing position, bend forward from the waist with your knees slightly bent. Let your arms dangle close to the floor.  Breathe in slowly and deeply as you return to a standing position. Roll up slowly and lift your head last.  Hold your breath for just a few seconds in the standing position.  Breathe out slowly and bend forward from the waist.  Let your feelings out. Talk, laugh, cry, and express anger when you need to. Talking with supportive friends or family, a counselor, or a erick leader about your feelings is a healthy way to relieve stress. Avoid discussing your feelings with people who make you feel worse.  Write. It may help to write about things that are bothering you. This helps you find out how much stress you feel and what is causing it. When you know this, you can find better ways to cope.  What can you do to prevent stress?  You might try some of these things to help prevent stress:  Manage your time. This helps you find time to do the things you want and need to do.  Get enough sleep. Your body recovers from the stresses of the day while you are sleeping.  Get support. Your family, friends, and community can make a difference in how you experience stress.  Limit your news feed. Avoid or limit time on social media or news that may make you feel stressed.  Do something active. Exercise or activity can help reduce stress. Walking is a great way to get started.  Where can you learn more?  Go to https://www.healthwise.net/patiented  Enter N032 in the  "search box to learn more about \"Learning About Stress.\"  Current as of: October 24, 2023               Content Version: 14.0    8486-2454 [employer].   Care instructions adapted under license by your healthcare professional. If you have questions about a medical condition or this instruction, always ask your healthcare professional. [employer] disclaims any warranty or liability for your use of this information.      Substance Use Disorder: Care Instructions  Overview     You can improve your life and health by stopping your use of alcohol or drugs. When you don't drink or use drugs, you may feel and sleep better. You may get along better with your family, friends, and coworkers. There are medicines and programs that can help with substance use disorder.  How can you care for yourself at home?  Here are some ways to help you stay sober and prevent relapse.  If you have been given medicine to help keep you sober or reduce your cravings, be sure to take it exactly as prescribed.  Talk to your doctor about programs that can help you stop using drugs or drinking alcohol.  Do not keep alcohol or drugs in your home.  Plan ahead. Think about what you'll say if other people ask you to drink or use drugs. Try not to spend time with people who drink or use drugs.  Use the time and money spent on drinking or drugs to do something that's important to you.  Preventing a relapse  Have a plan to deal with relapse. Learn to recognize changes in your thinking that lead you to drink or use drugs. Get help before you start to drink or use drugs again.  Try to stay away from situations, friends, or places that may lead you to drink or use drugs.  If you feel the need to drink alcohol or use drugs again, seek help right away. Call a trusted friend or family member. Some people get support from organizations such as Narcotics Anonymous or [employer] or from treatment facilities.  If you relapse, get help " as soon as you can. Some people make a plan with another person that outlines what they want that person to do for them if they relapse. The plan usually includes how to handle the relapse and who to notify in case of relapse.  Don't give up. Remember that a relapse doesn't mean that you have failed. Use the experience to learn the triggers that lead you to drink or use drugs. Then quit again. Recovery is a lifelong process. Many people have several relapses before they are able to quit for good.  Follow-up care is a key part of your treatment and safety. Be sure to make and go to all appointments, and call your doctor if you are having problems. It's also a good idea to know your test results and keep a list of the medicines you take.  When should you call for help?   Call 911  anytime you think you may need emergency care. For example, call if you or someone else:    Has overdosed or has withdrawal signs. Be sure to tell the emergency workers that you are or someone else is using or trying to quit using drugs. Overdose or withdrawal signs may include:  Losing consciousness.  Seizure.  Seeing or hearing things that aren't there (hallucinations).     Is thinking or talking about suicide or harming others.   Where to get help 24 hours a day, 7 days a week   If you or someone you know talks about suicide, self-harm, a mental health crisis, a substance use crisis, or any other kind of emotional distress, get help right away. You can:    Call the Suicide and Crisis Lifeline at 986.     Call 9-685-556-TALK (1-655.425.4248).     Text HOME to 992947 to access the Crisis Text Line.   Consider saving these numbers in your phone.  Go to [employer].org for more information or to chat online.  Call your doctor now or seek immediate medical care if:    You are having withdrawal symptoms. These may include nausea or vomiting, sweating, shakiness, and anxiety.   Watch closely for changes in your health, and be sure to contact  "your doctor if:    You have a relapse.     You need more help or support to stop.   Where can you learn more?  Go to https://www.healthVanquish Oncology.net/patiented  Enter H573 in the search box to learn more about \"Substance Use Disorder: Care Instructions.\"  Current as of: November 15, 2023               Content Version: 14.0    2775-1198 [employer].   Care instructions adapted under license by your healthcare professional. If you have questions about a medical condition or this instruction, always ask your healthcare professional. Healthwise, [employer] disclaims any warranty or liability for your use of this information.      "

## 2024-04-26 NOTE — COMMUNITY RESOURCES LIST (ENGLISH)
April 26, 2024           YOUR PERSONALIZED LIST OF SERVICES & PROGRAMS               Bill Payment Assistance      [employer] ([facility]) [facility] - Energy Assistance  [address] [place] [address] (Distance: 1.0 miles)  Phone: [phone]  Language: English, Kuwaiti, Hmong, Malagasy  Fee: Free  Accessibility: Translation services      [facility] - Utility payment assistance  [address] [place] [address] (Distance: 4.0 miles)  Phone: [phone]  Language: English, Kuwaiti, Malagasy, Hmong  Fee: Free  Accessibility: Ada accessible, Translation services      - Dislocated Worker/Adult [employer] Employment Program  Phone: (776) [phone]  Email: monique@[employer]  Website: https://[employer]/services/employment-services/dislocated-worker-program/  Language: English, Malagasy  Hours: Mon 8:00 AM - 4:30 PM Tue 8:00 AM - 4:30 PM Wed 8:00 AM - 4:30 PM Thu 8:00 AM - 4:30 PM Fri 8:00 AM - 4:30 PM  Fee: Free  Accessibility: Ada accessible               IMPORTANT NUMBERS & WEBSITES        Emergency Services  911  .   United Way  211 http://211unitedway.org  .   Poison Control  (715) 328-9108 http://[place]poison.org http://w[place]poison.org  .     Suicide and Crisis Lifeline  988 http://988lifeline.org  .   Childhelp National Child Abuse Hotline  132.215.3307 http://Childhelphotline.org   .   National Sexual Assault Hotline  (814) 199-6178 (HOPE) http://Rainn.org   .     National Runaway Safeline  (838) 752-7629 (RUNAWAY) http://1800runaway.org  .   Pregnancy & Postpartum Support  Call/text 111-774-5001  [place]: http://ppsupportmn.org  [place]: http://[place].com/wi  .   Substance Abuse National Helpline ([facility])  718-098-HELP (6837) http://Findtreatment.gov   .                DISCLAIMER: These resources have been generated via the [employer] Platform. [employer] does not endorse any service providers mentioned in this resource list. [employer] does not guarantee that the services  mentioned in this resource list will be available to you or will improve your health or wellness.    [facility]

## 2024-11-23 DIAGNOSIS — J45.21 MILD INTERMITTENT ASTHMA WITH ACUTE EXACERBATION: ICD-10-CM

## 2024-11-25 RX ORDER — DILTIAZEM HYDROCHLORIDE 60 MG/1
TABLET, FILM COATED ORAL
Qty: 10.2 G | Refills: 0 | Status: SHIPPED | OUTPATIENT
Start: 2024-11-25

## 2024-11-25 NOTE — TELEPHONE ENCOUNTER
LVM.  Upon return call please relay providers note.  Nancy Madrigal MA on 11/25/2024 at 11:54 AM

## 2024-12-07 ENCOUNTER — IMMUNIZATION (OUTPATIENT)
Dept: FAMILY MEDICINE | Facility: CLINIC | Age: 39
End: 2024-12-07
Payer: COMMERCIAL

## 2024-12-07 PROCEDURE — 91320 SARSCV2 VAC 30MCG TRS-SUC IM: CPT

## 2024-12-07 PROCEDURE — 90471 IMMUNIZATION ADMIN: CPT

## 2024-12-07 PROCEDURE — 90656 IIV3 VACC NO PRSV 0.5 ML IM: CPT

## 2024-12-07 PROCEDURE — 90480 ADMN SARSCOV2 VAC 1/ONLY CMP: CPT

## 2025-04-23 SDOH — HEALTH STABILITY: PHYSICAL HEALTH: ON AVERAGE, HOW MANY MINUTES DO YOU ENGAGE IN EXERCISE AT THIS LEVEL?: 20 MIN

## 2025-04-23 SDOH — HEALTH STABILITY: PHYSICAL HEALTH: ON AVERAGE, HOW MANY DAYS PER WEEK DO YOU ENGAGE IN MODERATE TO STRENUOUS EXERCISE (LIKE A BRISK WALK)?: 5 DAYS

## 2025-04-23 ASSESSMENT — ASTHMA QUESTIONNAIRES
QUESTION_5 LAST FOUR WEEKS HOW WOULD YOU RATE YOUR ASTHMA CONTROL: WELL CONTROLLED
QUESTION_1 LAST FOUR WEEKS HOW MUCH OF THE TIME DID YOUR ASTHMA KEEP YOU FROM GETTING AS MUCH DONE AT WORK, SCHOOL OR AT HOME: NONE OF THE TIME
QUESTION_2 LAST FOUR WEEKS HOW OFTEN HAVE YOU HAD SHORTNESS OF BREATH: THREE TO SIX TIMES A WEEK
QUESTION_3 LAST FOUR WEEKS HOW OFTEN DID YOUR ASTHMA SYMPTOMS (WHEEZING, COUGHING, SHORTNESS OF BREATH, CHEST TIGHTNESS OR PAIN) WAKE YOU UP AT NIGHT OR EARLIER THAN USUAL IN THE MORNING: ONCE A WEEK
ACT_TOTALSCORE: 19
QUESTION_4 LAST FOUR WEEKS HOW OFTEN HAVE YOU USED YOUR RESCUE INHALER OR NEBULIZER MEDICATION (SUCH AS ALBUTEROL): ONCE A WEEK OR LESS

## 2025-04-23 ASSESSMENT — SOCIAL DETERMINANTS OF HEALTH (SDOH): HOW OFTEN DO YOU GET TOGETHER WITH FRIENDS OR RELATIVES?: ONCE A WEEK

## 2025-04-28 ENCOUNTER — OFFICE VISIT (OUTPATIENT)
Dept: FAMILY MEDICINE | Facility: CLINIC | Age: 40
End: 2025-04-28
Attending: NURSE PRACTITIONER
Payer: COMMERCIAL

## 2025-04-28 VITALS
RESPIRATION RATE: 12 BRPM | BODY MASS INDEX: 21.7 KG/M2 | WEIGHT: 151.6 LBS | SYSTOLIC BLOOD PRESSURE: 100 MMHG | TEMPERATURE: 97.7 F | DIASTOLIC BLOOD PRESSURE: 64 MMHG | HEIGHT: 70 IN | OXYGEN SATURATION: 95 % | HEART RATE: 92 BPM

## 2025-04-28 DIAGNOSIS — Z80.42 FAMILY HISTORY OF PROSTATE CANCER: ICD-10-CM

## 2025-04-28 DIAGNOSIS — Z00.00 ROUTINE GENERAL MEDICAL EXAMINATION AT A HEALTH CARE FACILITY: Primary | ICD-10-CM

## 2025-04-28 DIAGNOSIS — J45.21 MILD INTERMITTENT ASTHMA WITH ACUTE EXACERBATION: ICD-10-CM

## 2025-04-28 DIAGNOSIS — Z13.220 SCREENING FOR LIPID DISORDERS: ICD-10-CM

## 2025-04-28 PROCEDURE — 90677 PCV20 VACCINE IM: CPT

## 2025-04-28 PROCEDURE — 3078F DIAST BP <80 MM HG: CPT

## 2025-04-28 PROCEDURE — 99395 PREV VISIT EST AGE 18-39: CPT | Mod: 25

## 2025-04-28 PROCEDURE — 99214 OFFICE O/P EST MOD 30 MIN: CPT | Mod: 25

## 2025-04-28 PROCEDURE — 3074F SYST BP LT 130 MM HG: CPT

## 2025-04-28 PROCEDURE — 1126F AMNT PAIN NOTED NONE PRSNT: CPT

## 2025-04-28 PROCEDURE — 90471 IMMUNIZATION ADMIN: CPT

## 2025-04-28 RX ORDER — BUDESONIDE AND FORMOTEROL FUMARATE DIHYDRATE 80; 4.5 UG/1; UG/1
2 AEROSOL RESPIRATORY (INHALATION)
Qty: 10.2 G | Refills: 5 | Status: SHIPPED | OUTPATIENT
Start: 2025-04-28

## 2025-04-28 ASSESSMENT — PAIN SCALES - GENERAL: PAINLEVEL_OUTOF10: NO PAIN (0)

## 2025-04-28 NOTE — PROGRESS NOTES
Preventive Care Visit  [facility]  DARIUS Pittman CNP, Nurse Practitioner Primary Care  Apr 28, 2025        Assessment & Plan     Routine general medical examination at a health care facility  Preventative exam completed today. Discussed healthy lifestyle recommendations of getting 150 minutes of exercise weekly and eating a healthy diet. Reviewed and updated health maintenance. Vaccines updated today. Will return for fasting labs.   - PRIMARY CARE FOLLOW-UP SCHEDULING  - REVIEW OF HEALTH MAINTENANCE PROTOCOL ORDERS  - Pneumococcal 20 Valent Conjugate (Prevnar 20)  - PRIMARY CARE FOLLOW-UP SCHEDULING  - Comprehensive metabolic panel (BMP + Alb, Alk Phos, ALT, AST, Total. Bili, TP)  - CBC with platelets    Mild intermittent asthma with acute exacerbation  Triggers include seasonal allergies, cat allergies, dust/mold. Well managed with prn Symbicort and allegra.  - Pneumococcal 20 Valent Conjugate (Prevnar 20)  - budesonide-formoterol (SYMBICORT) 80-4.5 MCG/ACT Inhaler  Dispense: 10.2 g; Refill: 5    Family history of prostate cancer  Family  history significant for multiple men on his father's side with prostate cancer.   - PSA, screen    Screening for lipid disorders  - Lipid panel reflex to direct LDL Fasting    Will return for fasting labs.  Plan to follow up in one year for routine annual physical.       Patient has been advised of split billing requirements and indicates understanding: Yes        Nicotine/Tobacco Cessation  He reports that he has been smoking cigarettes. He started smoking about 21 years ago. He has a 2.3 pack-year smoking history. He has never used smokeless tobacco.  Nicotine/Tobacco Cessation Plan  Discussed. Has tried the gum but is too strong as he smokes so infrequently.      Counseling  Appropriate preventive services were addressed with this patient via screening, questionnaire, or discussion as appropriate for fall prevention, nutrition, physical activity,  Tobacco-use cessation, social engagement, weight loss and cognition.  Checklist reviewing preventive services available has been given to the patient.  Reviewed patient's diet, addressing concerns and/or questions.   He is at risk for psychosocial distress and has been provided with information to reduce risk.       Follow-up    Follow-up Visit   Expected date:  Apr 28, 2026 (Approximate)      Follow Up Appointment Details:     Follow-up with whom?: PCP    Follow-Up for what?: Adult Preventive    How?: In Person                 Subjective   CHRISTY is a 39 year old, presenting for the following:  Physical (Pt would like to discuss family hx (dad side - all male memebers) of prostate cancer and issue. Pt would like to discuss PSA lab testing) and Refill Request (Inhaler refill requested - pended)        4/28/2025     3:50 PM   Additional Questions   Roomed by Isidra         4/28/2025   Forms   Any forms needing to be completed Yes          HPI    Family history of prostate cancer - dad has 5 brothers all have had prostate removed or known issues with prostate. Paternal grandfather also with prostate cancer.    Exercise: Works out every morning.    Symbicort - Allergies - worse in the spring/fall. Dust/mold.  Will take allegra if needed. Also worse around their cat. Makes sure to vaccuum and avoid places the cat sits.    2 boys: 7 and 9. [employer].         Advance Care Planning  Discussed advance care planning with patient; however, patient declined at this time.        4/23/2025   General Health   How would you rate your overall physical health? Excellent   Feel stress (tense, anxious, or unable to sleep) Rather much   (!) STRESS CONCERN      4/23/2025   Nutrition   Three or more servings of calcium each day? Yes   Diet: Regular (no restrictions)   How many servings of fruit and vegetables per day? 4 or more   How many sweetened beverages each day? (!) 2         4/23/2025   Exercise   Days per week of  moderate/strenous exercise 5 days   Average minutes spent exercising at this level 20 min         2025   Social Factors   Frequency of gathering with friends or relatives Once a week   Worry food won't last until get money to buy more No   Food not last or not have enough money for food? No   Do you have housing? (Housing is defined as stable permanent housing and does not include staying outside in a car, in a tent, in an abandoned building, in an overnight shelter, or couch-surfing.) Yes   Are you worried about losing your housing? No   Lack of transportation? No   Unable to get utilities (heat,electricity)? No         2025   Dental   Dentist two times every year? Yes         Today's PHQ-2 Score:       2025     6:53 AM   PHQ-2 (  [employer])   Q1: Little interest or pleasure in doing things 1   Q2: Feeling down, depressed or hopeless 1   PHQ-2 Score 2    Q1: Little interest or pleasure in doing things Several days   Q2: Feeling down, depressed or hopeless Several days   PHQ-2 Score 2       Patient-reported           2025   Substance Use   Alcohol more than 3/day or more than 7/wk No   Do you use any other substances recreationally? (!) CANNABIS PRODUCTS     Social History     Tobacco Use    Smoking status: Some Days     Current packs/day: 0.00     Average packs/day: 0.3 packs/day for 9.0 years (2.3 ttl pk-yrs)     Types: Cigarettes     Start date: 2003     Last attempt to quit: 2012     Years since quittin.4    Smokeless tobacco: Never   Vaping Use    Vaping status: Never Used   Substance Use Topics    Alcohol use: Yes     Alcohol/week: 0.0 standard drinks of alcohol     Comment: 2-3 drinks per week    Drug use: No         2025   STI Screening   New sexual partner(s) since last STI/HIV test? No       Reviewed and updated as needed this visit by Provider                  Lab work is in process  Labs reviewed in EPIC  BP Readings from Last 3 Encounters:   25 100/64  "  24 110/60   23 113/74    Wt Readings from Last 3 Encounters:   25 68.8 kg (151 lb 9.6 oz)   24 65.5 kg (144 lb 6.4 oz)   23 65.8 kg (145 lb)                  There is no problem list on file for this patient.    Past Surgical History:   Procedure Laterality Date    testicular surgery      torsion       Social History     Tobacco Use    Smoking status: Some Days     Current packs/day: 0.00     Average packs/day: 0.3 packs/day for 9.0 years (2.3 ttl pk-yrs)     Types: Cigarettes     Start date: 2003     Last attempt to quit: 2012     Years since quittin.4    Smokeless tobacco: Never   Substance Use Topics    Alcohol use: Yes     Alcohol/week: 0.0 standard drinks of alcohol     Comment: 2-3 drinks per week     Family History   Problem Relation Age of Onset    Colon Cancer Paternal Grandmother     Cancer Paternal Grandfather     Diabetes Maternal Grandfather     Lung Cancer Paternal Aunt          Current Outpatient Medications   Medication Sig Dispense Refill    SYMBICORT 80-4.5 MCG/ACT Inhaler INHALE 2 PUFFS INTO THE LUNGS 2 TIMES DAILY FOR 90 DAYS 10.2 g 0     No Known Allergies      Review of Systems  Constitutional, HEENT, cardiovascular, pulmonary, GI, , musculoskeletal, neuro, skin, endocrine and psych systems are negative, except as otherwise noted.     Objective    Exam  /64 (BP Location: Left arm, Patient Position: Sitting, Cuff Size: Adult Regular)   Pulse 92   Temp 97.7  F (36.5  C) (Temporal)   Resp 12   Ht 1.77 m (5' 9.69\")   Wt 68.8 kg (151 lb 9.6 oz)   SpO2 95%   BMI 21.95 kg/m     Estimated body mass index is 21.95 kg/m  as calculated from the following:    Height as of this encounter: 1.77 m (5' 9.69\").    Weight as of this encounter: 68.8 kg (151 lb 9.6 oz).    Physical Exam  GENERAL: alert and no distress  EYES: Eyes grossly normal to inspection, PERRL and conjunctivae and sclerae normal  HENT: ear canals and TM's normal, nose and mouth " without ulcers or lesions  NECK: no adenopathy, no asymmetry, masses, or scars  RESP: lungs clear to auscultation - no rales, rhonchi or wheezes  CV: regular rate and rhythm, normal S1 S2, no S3 or S4, no murmur, click or rub, no peripheral edema  ABDOMEN: soft, nontender, no hepatosplenomegaly, no masses and bowel sounds normal  MS: no gross musculoskeletal defects noted, no edema  SKIN: no suspicious lesions or rashes  NEURO: Normal strength and tone, mentation intact and speech normal  PSYCH: mentation appears normal, affect normal/bright      Signed Electronically by: DARIUS Pittman CNP

## 2025-04-28 NOTE — PATIENT INSTRUCTIONS
Patient Education   Preventive Care Advice   This is general advice given by our system to help you stay healthy. However, your care team may have specific advice just for you. Please talk to your care team about your preventive care needs.  Nutrition  Eat 5 or more servings of fruits and vegetables each day.  Try wheat bread, brown rice and whole grain pasta (instead of white bread, rice, and pasta).  Get enough calcium and vitamin D. Check the label on foods and aim for 100% of the RDA (recommended daily allowance).  Lifestyle  Exercise at least 150 minutes each week  (30 minutes a day, 5 days a week).  Do muscle strengthening activities 2 days a week. These help control your weight and prevent disease.  No smoking.  Wear sunscreen to prevent skin cancer.  Have a dental exam and cleaning every 6 months.  Yearly exams  See your health care team every year to talk about:  Any changes in your health.  Any medicines your care team has prescribed.  Preventive care, family planning, and ways to prevent chronic diseases.  Shots (vaccines)   HPV shots (up to age 26), if you've never had them before.  Hepatitis B shots (up to age 59), if you've never had them before.  COVID-19 shot: Get this shot when it's due.  Flu shot: Get a flu shot every year.  Tetanus shot: Get a tetanus shot every 10 years.  Pneumococcal, hepatitis A, and RSV shots: Ask your care team if you need these based on your risk.  Shingles shot (for age 50 and up)  General health tests  Diabetes screening:  Starting at age 35, Get screened for diabetes at least every 3 years.  If you are younger than age 35, ask your care team if you should be screened for diabetes.  Cholesterol test: At age 39, start having a cholesterol test every 5 years, or more often if advised.  Bone density scan (DEXA): At age 50, ask your care team if you should have this scan for osteoporosis (brittle bones).  Hepatitis C: Get tested at least once in your life.  STIs (sexually  transmitted infections)  Before age 24: Ask your care team if you should be screened for STIs.  After age 24: Get screened for STIs if you're at risk. You are at risk for STIs (including HIV) if:  You are sexually active with more than one person.  You don't use condoms every time.  You or a partner was diagnosed with a sexually transmitted infection.  If you are at risk for HIV, ask about PrEP medicine to prevent HIV.  Get tested for HIV at least once in your life, whether you are at risk for HIV or not.  Cancer screening tests  Cervical cancer screening: If you have a cervix, begin getting regular cervical cancer screening tests starting at age 21.  Breast cancer scan (mammogram): If you've ever had breasts, begin having regular mammograms starting at age 40. This is a scan to check for breast cancer.  Colon cancer screening: It is important to start screening for colon cancer at age 45.  Have a colonoscopy test every 10 years (or more often if you're at risk) Or, ask your provider about stool tests like a FIT test every year or Cologuard test every 3 years.  To learn more about your testing options, visit:   .  For help making a decision, visit:   https://bit.ly/pi29436.  Prostate cancer screening test: If you have a prostate, ask your care team if a prostate cancer screening test (PSA) at age 55 is right for you.  Lung cancer screening: If you are a current or former smoker ages 50 to 80, ask your care team if ongoing lung cancer screenings are right for you.  For informational purposes only. Not to replace the advice of your health care provider. Copyright   2023 [facility]. All rights reserved. Clinically reviewed by the [facility] Transitions Program. [employer] [id number] - REV 01/24.  Learning About Stress  What is stress?     Stress is your body's response to a hard situation. Your body can have a physical, emotional, or mental response. Stress is a fact of life for most people, and it  affects everyone differently. What causes stress for you may not be stressful for someone else.  A lot of things can cause stress. You may feel stress when you go on a job interview, take a test, or run a race. This kind of short-term stress is normal and even useful. It can help you if you need to work hard or react quickly. For example, stress can help you finish an important job on time.  Long-term stress is caused by ongoing stressful situations or events. Examples of long-term stress include long-term health problems, ongoing problems at work, or conflicts in your family. Long-term stress can harm your health.  How does stress affect your health?  When you are stressed, your body responds as though you are in danger. It makes hormones that speed up your heart, make you breathe faster, and give you a burst of energy. This is called the fight-or-flight stress response. If the stress is over quickly, your body goes back to normal and no harm is done.  But if stress happens too often or lasts too long, it can have bad effects. Long-term stress can make you more likely to get sick, and it can make symptoms of some diseases worse. If you tense up when you are stressed, you may develop neck, shoulder, or low back pain. Stress is linked to high blood pressure and heart disease.  Stress also harms your emotional health. It can make you gatica, tense, or depressed. Your relationships may suffer, and you may not do well at work or school.  What can you do to manage stress?  You can try these things to help manage stress:   Do something active. Exercise or activity can help reduce stress. Walking is a great way to get started. Even everyday activities such as housecleaning or yard work can help.  Try yoga or hannah chi. These techniques combine exercise and meditation. You may need some training at first to learn them.  Do something you enjoy. For example, listen to music or go to a movie. Practice your hobby or do volunteer  "work.  Meditate. This can help you relax, because you are not worrying about what happened before or what may happen in the future.  Do guided imagery. Imagine yourself in any setting that helps you feel calm. You can use online videos, books, or a teacher to guide you.  Do breathing exercises. For example:  From a standing position, bend forward from the waist with your knees slightly bent. Let your arms dangle close to the floor.  Breathe in slowly and deeply as you return to a standing position. Roll up slowly and lift your head last.  Hold your breath for just a few seconds in the standing position.  Breathe out slowly and bend forward from the waist.  Let your feelings out. Talk, laugh, cry, and express anger when you need to. Talking with supportive friends or family, a counselor, or a erick leader about your feelings is a healthy way to relieve stress. Avoid discussing your feelings with people who make you feel worse.  Write. It may help to write about things that are bothering you. This helps you find out how much stress you feel and what is causing it. When you know this, you can find better ways to cope.  What can you do to prevent stress?  You might try some of these things to help prevent stress:  Manage your time. This helps you find time to do the things you want and need to do.  Get enough sleep. Your body recovers from the stresses of the day while you are sleeping.  Get support. Your family, friends, and community can make a difference in how you experience stress.  Limit your news feed. Avoid or limit time on social media or news that may make you feel stressed.  Do something active. Exercise or activity can help reduce stress. Walking is a great way to get started.  Where can you learn more?  Go to https://www.[employer].net/patiented  Enter N032 in the search box to learn more about \"Learning About Stress.\"  Current as of: October 24, 2024  Content Version: 14.4 2024-2025 [employer].   Care instructions adapted under license by your healthcare professional. If you have questions about a medical condition or this instruction, always ask your healthcare professional. [employer] disclaims any warranty or liability for your use of this information.    Substance Use Disorder: Care Instructions  Overview     You can improve your life and health by stopping your use of alcohol or drugs. When you don't drink or use drugs, you may feel and sleep better. You may get along better with your family, friends, and coworkers. There are medicines and programs that can help with substance use disorder.  How can you care for yourself at home?  Here are some ways to help you stay sober and prevent relapse.  If you have been given medicine to help keep you sober or reduce your cravings, be sure to take it exactly as prescribed.  Talk to your doctor about programs that can help you stop using drugs or drinking alcohol.  Do not keep alcohol or drugs in your home.  Plan ahead. Think about what you'll say if other people ask you to drink or use drugs. Try not to spend time with people who drink or use drugs.  Use the time and money spent on drinking or drugs to do something that's important to you.  Preventing a relapse  Have a plan to deal with relapse. Learn to recognize changes in your thinking that lead you to drink or use drugs. Get help before you start to drink or use drugs again.  Try to stay away from situations, friends, or places that may lead you to drink or use drugs.  If you feel the need to drink alcohol or use drugs again, seek help right away. Call a trusted friend or family member. Some people get support from organizations such as Narcotics Anonymous or [employer] or from treatment facilities.  If you relapse, get help as soon as you can. Some people make a plan with another person that outlines what they want that person to do for them if they relapse. The plan usually includes  how to handle the relapse and who to notify in case of relapse.  Don't give up. Remember that a relapse doesn't mean that you have failed. Use the experience to learn the triggers that lead you to drink or use drugs. Then quit again. Recovery is a lifelong process. Many people have several relapses before they are able to quit for good.  Follow-up care is a key part of your treatment and safety. Be sure to make and go to all appointments, and call your doctor if you are having problems. It's also a good idea to know your test results and keep a list of the medicines you take.  When should you call for help?   Call 911  anytime you think you may need emergency care. For example, call if you or someone else:    Has overdosed or has withdrawal signs. Be sure to tell the emergency workers that you are or someone else is using or trying to quit using drugs. Overdose or withdrawal signs may include:  Losing consciousness.  Seizure.  Seeing or hearing things that aren't there (hallucinations).     Is thinking or talking about suicide or harming others.   Where to get help 24 hours a day, 7 days a week   If you or someone you know talks about suicide, self-harm, a mental health crisis, a substance use crisis, or any other kind of emotional distress, get help right away. You can:    Call the Suicide and Crisis Lifeline at 516.     Call 8-502-493-TALK (1-389.889.1444).     Text HOME to 662161 to access the Crisis Text Line.   Consider saving these numbers in your phone.  Go to [email or web address] for more information or to chat online.  Call your doctor now or seek immediate medical care if:    You are having withdrawal symptoms. These may include nausea or vomiting, sweating, shakiness, and anxiety.   Watch closely for changes in your health, and be sure to contact your doctor if:    You have a relapse.     You need more help or support to stop.   Where can you learn more?  Go to https://www.healthwise.net/patiented  Enter H573  "in the search box to learn more about \"Substance Use Disorder: Care Instructions.\"  Current as of: August 20, 2024  Content Version: 14.4 2024-2025 [employer].   Care instructions adapted under license by your healthcare professional. If you have questions about a medical condition or this instruction, always ask your healthcare professional. [employer] disclaims any warranty or liability for your use of this information.       "

## 2025-07-23 DIAGNOSIS — Z79.899 MEDICATION MANAGEMENT: Primary | ICD-10-CM

## 2025-07-23 RX ORDER — METRONIDAZOLE 500 MG/1
500 TABLET ORAL 2 TIMES DAILY
Qty: 14 TABLET | Refills: 0 | Status: SHIPPED | OUTPATIENT
Start: 2025-07-23

## (undated) RX ORDER — LIDOCAINE HYDROCHLORIDE 20 MG/ML
INJECTION, SOLUTION INFILTRATION; PERINEURAL
Status: DISPENSED
Start: 2023-11-24